# Patient Record
Sex: FEMALE | Race: WHITE | Employment: OTHER | URBAN - NONMETROPOLITAN AREA
[De-identification: names, ages, dates, MRNs, and addresses within clinical notes are randomized per-mention and may not be internally consistent; named-entity substitution may affect disease eponyms.]

---

## 2017-02-23 ENCOUNTER — HISTORY (OUTPATIENT)
Dept: FAMILY MEDICINE | Facility: OTHER | Age: 22
End: 2017-02-23

## 2017-02-23 ENCOUNTER — OFFICE VISIT - GICH (OUTPATIENT)
Dept: FAMILY MEDICINE | Facility: OTHER | Age: 22
End: 2017-02-23

## 2017-02-23 DIAGNOSIS — H60.90 OTITIS EXTERNA: ICD-10-CM

## 2017-06-16 ENCOUNTER — OFFICE VISIT - GICH (OUTPATIENT)
Dept: FAMILY MEDICINE | Facility: OTHER | Age: 22
End: 2017-06-16

## 2017-06-16 ENCOUNTER — COMMUNICATION - GICH (OUTPATIENT)
Dept: FAMILY MEDICINE | Facility: OTHER | Age: 22
End: 2017-06-16

## 2017-06-16 ENCOUNTER — HISTORY (OUTPATIENT)
Dept: FAMILY MEDICINE | Facility: OTHER | Age: 22
End: 2017-06-16

## 2017-06-16 DIAGNOSIS — J02.9 ACUTE PHARYNGITIS: ICD-10-CM

## 2017-06-16 DIAGNOSIS — H92.09 OTALGIA: ICD-10-CM

## 2017-06-16 LAB — STREP A ANTIGEN - HISTORICAL: NEGATIVE

## 2017-12-27 NOTE — PROGRESS NOTES
"Patient Information     Patient Name MRN Sex Fernandez Monroe [id number] Female 1995      Progress Notes by Mirna Awad NP at 2017 11:00 AM     Author:  Mirna Awad NP Service:  (none) Author Type:  PHYS- Nurse Practitioner     Filed:  2017  4:48 PM Encounter Date:  2017 Status:  Signed     :  Mirna Awad NP (PHYS- Nurse Practitioner)            HPI:    Fernandez Iverson is a 21 y.o. female who presents to clinic today with mother for ear/throat pain.   Bilateral ear pain and sore throat for the past 2 days.  No fevers.  Ears with pressure.   No ringing, buzzing, or pulsating.  Painful to swallow.  Mild headaches.  No runny or stuffy nose.  No cough.  Appetite normal.  Energy normal.  No OTC treatments.             Past Medical History:     Diagnosis  Date     Overweight      Physical exam, annual      No past surgical history on file.  Social History       Substance Use Topics         Smoking status:   Passive Smoke Exposure - Never Smoker     Smokeless tobacco:   Never Used      Comment: occasional      Alcohol use   No     No current outpatient prescriptions on file.     No current facility-administered medications for this visit.      Medications have been reviewed by me and are current to the best of my knowledge and ability.    Allergies     Allergen  Reactions     Sulfa (Sulfonamide Antibiotics) Hives       Past medical history, past surgical history, current medications and allergies reviewed and accurate to the best of my knowledge.        ROS:  Refer to HPI    /70  Pulse 97  Temp 98.3  F (36.8  C) (Temporal)   Ht 1.57 m (5' 1.81\")  Wt 79.8 kg (176 lb)  Breastfeeding? No  BMI 32.39 kg/m2    EXAM:  General Appearance: Well appearing female adolescent, appropriate appearance for age. No acute distress  Head: normocephalic, atraumatic  Ears: Left TM with bony landmarks appreciated, no erythema, no effusion, no bulging, no purulence.  Right TM " with bony landmarks appreciated, no erythema, no effusion, no bulging, no purulence.   Left auditory canal clear.  Right auditory canal clear.  Normal external ears, non tender.  Eyes: conjunctivae normal, no drainage  Orophayrnx: moist mucous membranes, posterior pharynx with erythema and irritation, tonsils without hypertrophy, no erythema, no exudates or petechiae, no post nasal drip seen.    Neck: supple without adenopathy  Respiratory: normal chest wall and respirations.  Normal effort.  Clear to auscultation bilaterally, no wheezing, crackles or rhonchi.  No increased work of breathing.  No cough appreciated  Cardiac: RRR with no murmurs  Musculoskeletal:  Normal gait.  Equal movement of bilateral upper extremities.  Equal movement of bilateral lower extremities.    Psychological: normal affect, alert and pleasant      Labs:  Results for orders placed or performed in visit on 06/16/17      RAPID STREP WITH REFLEX CULTURE      Result  Value Ref Range    STREP A ANTIGEN           Negative Negative           ASSESSMENT/PLAN:    ICD-10-CM    1. Sore throat J02.9 RAPID STREP WITH REFLEX CULTURE      RAPID STREP WITH REFLEX CULTURE   2. Ear ache H92.09          No indications of ear infection on exam  Negative rapid strep test  Likely viral illness.  No antibiotics indicated.  Encouraged fluids  Symptomatic treatment - tea, salt water gargles, honey, elevation, lozenges, etc   Tylenol or ibuprofen PRN  Follow up if symptoms persist or worsen or concerns        Patient Instructions   Will call with strep results      Salt water gargles      Honey    Tea    Lozenges/cough drops      Fluids      Tylenol or ibuprofen

## 2017-12-28 NOTE — TELEPHONE ENCOUNTER
Patient Information     Patient Name MRFernandez Ocasio [id number] Female 1995      Telephone Encounter by Nkechi Finley at 2017 12:03 PM     Author:  Nkechi Finley Service:  (none) Author Type:  (none)     Filed:  2017 12:03 PM Encounter Date:  2017 Status:  Signed     :  Nkechi Finley            ----- Message from Steven Merida NP sent at 2017 11:56 AM CDT -----  Call patient  Negative rapid strep test  STEVEN MERIDA NP ....................  2017   11:56 AM

## 2017-12-28 NOTE — TELEPHONE ENCOUNTER
Patient Information     Patient Name MRN Fernandez Zelaya [id number] Female 1995      Telephone Encounter by Nkechi Finley at 2017 12:03 PM     Author:  Nkechi Finley Service:  (none) Author Type:  (none)     Filed:  2017 12:04 PM Encounter Date:  2017 Status:  Signed     :  Nkechi Finley            Patient notified of results.   Nkechi Finley...2017..12:04 PM

## 2017-12-29 NOTE — PATIENT INSTRUCTIONS
Patient Information     Patient Name MRN Fernandez Zelaya [id number] Female 1995      Patient Instructions by Mirna Awad NP at 2017 11:00 AM     Author:  Mirna Awad NP Service:  (none) Author Type:  PHYS- Nurse Practitioner     Filed:  2017 11:29 AM Encounter Date:  2017 Status:  Signed     :  Mirna Awad NP (PHYS- Nurse Practitioner)            Will call with strep results      Salt water gargles      Honey    Tea    Lozenges/cough drops      Fluids      Tylenol or ibuprofen

## 2017-12-30 NOTE — NURSING NOTE
Patient Information     Patient Name MRN Fernandez Zelaya [id number] Female 1995      Nursing Note by Alina Ledbetter at 2017 11:00 AM     Author:  Alina Ledbetter Service:  (none) Author Type:  NURS- Student Practical Nurse     Filed:  2017 11:02 AM Encounter Date:  2017 Status:  Signed     :  Alina Ledbetter (NURS- Student Practical Nurse)            Patient presents with bilateral ear pain that goes down the side of the neck, headache for 2 days. Alina Ledbetter LPN .............2017  10:58 AM

## 2018-01-03 NOTE — PROGRESS NOTES
"Patient Information     Patient Name MRN Fernandez Zelaya [id number] Female 1995      Progress Notes by Lin Thacker MD at 2017  2:45 PM     Author:  Lin Thacker MD Service:  (none) Author Type:  Physician     Filed:  2017  5:06 PM Encounter Date:  2017 Status:  Signed     :  Lin Thacker MD (Physician)            SUBJECTIVE:  Fernandez Iverson is a 21 y.o. female who complains of ear pain.  Duration: 2 weeks  Severity: mild  Modifiers: nothing tried  Trend of symptoms: steady for several days  Fever: none  Ear pain is: left sided pressure like  Other symptoms include: drainage started a few days ago  Piercing on that side is red and tender  History of same illness: no prior history of similar illness  Ill contacts: no contacts known with similar symptoms  Current Outpatient Prescriptions       Medication  Sig Dispense Refill     ciprofloxacin-hydrocortisone (CIPRO HC) 0.2%-1% otic suspension Place 3 Drops into left ear 2 times daily for 7 days. 1 Bottle 0     No current facility-administered medications for this visit.      Medications have been reviewed by me and are current to the best of my knowledge and ability.    Allergies as of 2017 - Jv as Reviewed 2017      Allergen  Reaction Noted     Sulfa (sulfonamide antibiotics) Hives 2014       OBJECTIVE:  Visit Vitals       /74     Pulse 94     Ht 1.575 m (5' 2\")     Wt 79 kg (174 lb 3.2 oz)     LMP 2017     BMI 31.86 kg/m2     General appearance: healthy, alert and cooperative.   Left Ear: normal; external ear canal red and slightly swollen  Right Ear: normal; external ear canal and TM clear, nontender.  Nose: clear rhinorrhea  Oropharynx: normal pharynx and buccal mucosa. Dental hygeine adequate.  Neck: normal; supple with no masses or nodes.  Lungs:normal chest wall and respirations; clear to auscultation.  Heart: normal; regular rate and rhythm.  S1, S2, no murmur, " click, gallop, or rubs.    ASSESSMENT/PLAN:    ICD-10-CM    1. Otitis externa, unspecified chronicity, unspecified laterality, unspecified type H60.90 ciprofloxacin-hydrocortisone (CIPRO HC) 0.2%-1% otic suspension         Medications as per orders.   Symptomatic therapy suggested: use increased fluids and rest and acetaminophen prn.   Call or return to clinic prn if these symptoms worsen or fail to improve as anticipated.  Lin Aldana MD  5:05 PM 2/23/2017

## 2018-01-03 NOTE — NURSING NOTE
Patient Information     Patient Name MRN Fernandez Zelaya [id number] Female 1995      Nursing Note by Ludy Moses at 2017  2:45 PM     Author:  Ludy Moses Service:  (none) Author Type:  (none)     Filed:  2017  2:57 PM Encounter Date:  2017 Status:  Signed     :  Ludy Moses            Patient is here for left ear draining, started a couple weeks ago, having pain off and on.   Ludy Moses LPN .............2017  2:42 PM

## 2018-01-19 ENCOUNTER — HISTORY (OUTPATIENT)
Dept: FAMILY MEDICINE | Facility: OTHER | Age: 23
End: 2018-01-19

## 2018-01-19 ENCOUNTER — OFFICE VISIT - GICH (OUTPATIENT)
Dept: FAMILY MEDICINE | Facility: OTHER | Age: 23
End: 2018-01-19

## 2018-01-19 DIAGNOSIS — Z12.4 ENCOUNTER FOR SCREENING FOR MALIGNANT NEOPLASM OF CERVIX: ICD-10-CM

## 2018-01-19 DIAGNOSIS — Z00.00 ENCOUNTER FOR GENERAL ADULT MEDICAL EXAMINATION WITHOUT ABNORMAL FINDINGS: ICD-10-CM

## 2018-01-27 VITALS
HEART RATE: 94 BPM | WEIGHT: 174.2 LBS | DIASTOLIC BLOOD PRESSURE: 74 MMHG | SYSTOLIC BLOOD PRESSURE: 136 MMHG | HEIGHT: 62 IN | BODY MASS INDEX: 32.06 KG/M2

## 2018-01-27 VITALS
WEIGHT: 176 LBS | HEIGHT: 62 IN | HEART RATE: 97 BPM | DIASTOLIC BLOOD PRESSURE: 70 MMHG | BODY MASS INDEX: 32.39 KG/M2 | SYSTOLIC BLOOD PRESSURE: 122 MMHG | TEMPERATURE: 98.3 F

## 2018-02-05 ENCOUNTER — AMBULATORY - GICH (OUTPATIENT)
Dept: FAMILY MEDICINE | Facility: OTHER | Age: 23
End: 2018-02-05

## 2018-02-09 VITALS
WEIGHT: 179.6 LBS | HEART RATE: 102 BPM | BODY MASS INDEX: 33.05 KG/M2 | HEIGHT: 62 IN | SYSTOLIC BLOOD PRESSURE: 129 MMHG | DIASTOLIC BLOOD PRESSURE: 74 MMHG

## 2018-02-13 NOTE — NURSING NOTE
Patient Information     Patient Name MRN Fernandez Zelaya [id number] Female 1995      Nursing Note by Ludy Moses at 2018 12:45 PM     Author:  Ludy Moses Service:  (none) Author Type:  (none)     Filed:  2018  1:29 PM Encounter Date:  2018 Status:  Signed     :  Ludy Moses            Patient is here for physical, no concerns at this time.  Ludy Moses LPN .............2018  12:11 PM

## 2018-02-13 NOTE — PROGRESS NOTES
Patient Information     Patient Name MRN Sex Fernandez James [id number] Female 1995      Progress Notes by Lin Thacker MD at 2018 12:45 PM     Author:  Lin Thacker MD Service:  (none) Author Type:  Physician     Filed:  2018  1:29 PM Encounter Date:  2018 Status:  Signed     :  Lin Thacker MD (Physician)            ANNUAL EXAM ADULT FEMALE - GYN    Fernandez Iverson is a 22 y.o. female, G 0, P 0, here today for her annual gynecologic exam.  HPI:  Presents for annual exam. She is not sexually active, this will be her first pap smear. Regular menses  No other concerns    CURRENT MEDICATIONS:  No current outpatient prescriptions on file.     No current facility-administered medications for this visit.      Medications have been reviewed by me and are current to the best of my knowledge and ability.      ALLERGIES:  Sulfa (sulfonamide antibiotics)     Past Medical History:     Diagnosis  Date     Overweight(278.02)      Physical exam, annual        No past surgical history on file.    SOCIAL HISTORY:  Social History     Social History        Marital status:  Single     Spouse name: N/A     Number of children:  N/A     Years of education:  N/A     Occupational History      Not on file.     Social History Main Topics         Smoking status:   Passive Smoke Exposure - Never Smoker     Smokeless tobacco:   Never Used      Comment: occasional      Alcohol use   Yes     Drug use:   No     Sexual activity:   No     Other Topics  Concern     Not on file      Social History Narrative     Attending 7th grade  of .  Intact family.  Mother works for [employer]  Sibling; born .       No family history on file.    RISK FACTORS  Social History       Substance Use Topics         Smoking status:   Passive Smoke Exposure - Never Smoker     Smokeless tobacco:   Never Used      Comment: occasional      Alcohol use   Yes      Exercise Times/wk: 3  Seat  "Belt Use: 100%  Birth Control Method: none  High Risk/Behavior: none    PREVENTIVE SERVICES  Preventive services were reviewed today, January 19, 2018.    LMP: Patient's last menstrual period was 01/07/2018.  Menstrual Regularity: regular, every 28-30 days  Flow: light    ROS  Negative for Review of Systems not covered in the HPI.    PHYSICAL EXAM  /74 (Cuff Site: Right Arm, Position: Sitting, Cuff Size: Adult Regular)  Pulse (!) 102  Ht 1.562 m (5' 1.5\")  Wt 81.5 kg (179 lb 9.6 oz)  LMP 01/07/2018  BMI 33.39 kg/m2  General Appearance:  Alert, appropriate appearance for age. No acute distress  HEENT Exam:  Grossly normal.  Chest/Respiratory Exam: Normal chest wall and respirations. Clear to auscultation.  Cardiovascular Exam: Regular rate and rhythm. S1, S2, no murmur, click, gallop, or rubs.  Gastrointestinal Exam: Soft, non-tender, no masses or organomegaly.  Pelvic Exam Female: Vulva and vagina appear normal. Bimanual exam reveals normal uterus and adnexa. Clinical staff offered to be present for exam: .   Lymphatic Exam: Non-palpable nodes in neck, clavicular, axillary, or inguinal regions.  Musculoskeletal Exam: Back is straight and non-tender, full ROM of upper and lower extremities.  Psychiatric Exam: Alert and oriented, appropriate affect.  Clinical staff offered to be present for exam: yes, pt declined.  PHQ Depression Screening 1/19/2018   Date of PHQ exam (doc flow) 1/19/2018   1. Lack of interest/pleasure 0 - Not at all   2. Feeling down/depressed 0 - Not at all   PHQ-2 TOTAL SCORE 0   3. Trouble sleeping -   4. Decreased energy -   5. Appetite change -   6. Feelings of failure -   7. Trouble concentrating -   8. Activity level -   9. Hurting yourself -   PHQ-9 TOTAL SCORE -   PHQ-9 Severity Level -   Functional Impairment -   Some recent data might be hidden        ASSESSMENT/PLAN    ICD-10-CM    1. Annual physical exam Z00.00    2. Cervical cancer screening Z12.4 GYN THIN PREP PAP SCREEN " IMAGED       BP Readings from Last 1 Encounters:01/19/18 : 129/74  Ms. Iverson's blood pressure is out of the normal range for adults. Per JNC-8 guidelines normal adult blood pressure is < 120/80, pre-hypertensive is between 120/80 and 139/89, and hypertension is 140/90 or greater. Risks of hypertension were discussed. Patient's strategy will be weight loss and increased activity  Patient is counseled on importance of regular self breast exams, annual mammogram starting at the age of 40. Patient may go to every -3 years for screening Pap smears, should continue annual pelvic exams. Discussed the importance of calcium and vitamin D supplementation and screening for osteoporosis. Immunizations are current. Pap smear was performed today and patient will receive a letter with results. Reviewed the importance of sunscreen, seatbelt and helmet use.  Lin Aldana MD  1:29 PM 1/19/2018

## 2018-03-08 ENCOUNTER — DOCUMENTATION ONLY (OUTPATIENT)
Dept: FAMILY MEDICINE | Facility: OTHER | Age: 23
End: 2018-03-08

## 2018-03-25 ENCOUNTER — HEALTH MAINTENANCE LETTER (OUTPATIENT)
Age: 23
End: 2018-03-25

## 2021-02-19 ENCOUNTER — OFFICE VISIT (OUTPATIENT)
Dept: FAMILY MEDICINE | Facility: OTHER | Age: 26
End: 2021-02-19
Attending: FAMILY MEDICINE
Payer: COMMERCIAL

## 2021-02-19 VITALS
SYSTOLIC BLOOD PRESSURE: 122 MMHG | HEIGHT: 62 IN | HEART RATE: 78 BPM | TEMPERATURE: 98.9 F | WEIGHT: 176.4 LBS | DIASTOLIC BLOOD PRESSURE: 70 MMHG | RESPIRATION RATE: 20 BRPM | OXYGEN SATURATION: 100 % | BODY MASS INDEX: 32.46 KG/M2

## 2021-02-19 DIAGNOSIS — R53.83 OTHER FATIGUE: ICD-10-CM

## 2021-02-19 DIAGNOSIS — N92.4 EXCESSIVE BLEEDING IN PREMENOPAUSAL PERIOD: Primary | ICD-10-CM

## 2021-02-19 LAB
BASOPHILS # BLD AUTO: 0 10E9/L (ref 0–0.2)
BASOPHILS NFR BLD AUTO: 0.3 %
DEPRECATED CALCIDIOL+CALCIFEROL SERPL-MC: 14 NG/ML
DIFFERENTIAL METHOD BLD: NORMAL
EOSINOPHIL # BLD AUTO: 0 10E9/L (ref 0–0.7)
EOSINOPHIL NFR BLD AUTO: 0.3 %
ERYTHROCYTE [DISTWIDTH] IN BLOOD BY AUTOMATED COUNT: 12.7 % (ref 10–15)
FERRITIN SERPL-MCNC: 31 NG/ML (ref 23.9–336.2)
HCT VFR BLD AUTO: 37.9 % (ref 35–47)
HGB BLD-MCNC: 13.1 G/DL (ref 11.7–15.7)
IMM GRANULOCYTES # BLD: 0 10E9/L (ref 0–0.4)
IMM GRANULOCYTES NFR BLD: 0.3 %
LYMPHOCYTES # BLD AUTO: 1.7 10E9/L (ref 0.8–5.3)
LYMPHOCYTES NFR BLD AUTO: 26.2 %
MCH RBC QN AUTO: 29.4 PG (ref 26.5–33)
MCHC RBC AUTO-ENTMCNC: 34.6 G/DL (ref 31.5–36.5)
MCV RBC AUTO: 85 FL (ref 78–100)
MONOCYTES # BLD AUTO: 0.4 10E9/L (ref 0–1.3)
MONOCYTES NFR BLD AUTO: 5.7 %
NEUTROPHILS # BLD AUTO: 4.5 10E9/L (ref 1.6–8.3)
NEUTROPHILS NFR BLD AUTO: 67.2 %
PLATELET # BLD AUTO: 240 10E9/L (ref 150–450)
RBC # BLD AUTO: 4.45 10E12/L (ref 3.8–5.2)
TSH SERPL DL<=0.05 MIU/L-ACNC: 2.08 IU/ML (ref 0.34–5.6)
WBC # BLD AUTO: 6.6 10E9/L (ref 4–11)

## 2021-02-19 PROCEDURE — 82728 ASSAY OF FERRITIN: CPT | Mod: ZL | Performed by: FAMILY MEDICINE

## 2021-02-19 PROCEDURE — 36415 COLL VENOUS BLD VENIPUNCTURE: CPT | Mod: ZL | Performed by: FAMILY MEDICINE

## 2021-02-19 PROCEDURE — 84443 ASSAY THYROID STIM HORMONE: CPT | Mod: ZL | Performed by: FAMILY MEDICINE

## 2021-02-19 PROCEDURE — 82306 VITAMIN D 25 HYDROXY: CPT | Mod: ZL | Performed by: FAMILY MEDICINE

## 2021-02-19 PROCEDURE — 85025 COMPLETE CBC W/AUTO DIFF WBC: CPT | Mod: ZL | Performed by: FAMILY MEDICINE

## 2021-02-19 PROCEDURE — 99213 OFFICE O/P EST LOW 20 MIN: CPT | Performed by: FAMILY MEDICINE

## 2021-02-19 RX ORDER — LEVONORGESTREL AND ETHINYL ESTRADIOL 0.15-0.03
1 KIT ORAL DAILY
Qty: 84 TABLET | Refills: 3 | Status: SHIPPED | OUTPATIENT
Start: 2021-02-19 | End: 2022-01-24

## 2021-02-19 ASSESSMENT — MIFFLIN-ST. JEOR: SCORE: 1495.46

## 2021-02-19 ASSESSMENT — PAIN SCALES - GENERAL: PAINLEVEL: NO PAIN (0)

## 2021-02-19 NOTE — PROGRESS NOTES
"    Concerns today:   Nursing Notes:   Ludy Moses, LPN  2021  3:56 PM  Signed  Patient is here with her mom to discuss birth control. She is not currently on anything, is not sure what she would like to take.     Patient's last menstrual period was 2021 (exact date).  Medication Reconciliation: complete    Ludy Moses, LPN  2021 3:42 PM      24 yo female presents with a few concerns.  Her periods have become more painful and heavy.  She has heavy flow for 1 to 2 days.  Significant pain, nausea headaches and fatigue the first day.  She recently became sexually active.  Pap smear 2018 and HPV were normal.    Also complains of fatigue and is wondering if her iron is low.      ROS:  CONSTITUTIONAL: no fatigue, no unexpected change in weight  SKIN: no worrisome rashes, no worrisome moles, no worrisome lesions  EYES: no acute vision problems or changes  ENT: no ear problems, no mouth problems, no throat problems  RESP: no significant cough, no shortness of breath  CV: no chest pain, no palpitations, no new or worsening peripheral edema  GI: no nausea, no vomiting, no constipation, no diarrhea    Sexually Active: Yes  Sexual concerns: No   Contraception:none   P: 0  Menarche:  Patient's last menstrual period was 2021 (exact date). Menses: q 30 days  STD History: Neg  Last Pap Smear Date:  normal  Abnormal Pap History: None              EXAMINATION:   /70 (BP Location: Right arm, Patient Position: Sitting, Cuff Size: Adult Regular)   Pulse 78   Temp 98.9  F (37.2  C) (Tympanic)   Resp 20   Ht 1.562 m (5' 1.5\")   Wt 80 kg (176 lb 6.4 oz)   LMP 2021 (Exact Date)   SpO2 100%   Breastfeeding No   BMI 32.79 kg/m    GENERAL: healthy, alert and no distress  EYES: Eyes grossly normal to inspection, extraocular movements - intact, and PERRL  HENT: ear canals- normal; TMs- normal; Nose- normal; Mouth- no ulcers, no lesions  NECK: no tenderness, no adenopathy, no asymmetry, no " masses, no stiffness; thyroid- normal to palpation  RESP: lungs clear to auscultation - no rales, no rhonchi, no wheezes  CV: regular rates and rhythm, normal S1 S2, no S3 or S4 and no murmur, no click or rub -  LYMPHATICS: ant. cervical- normal, post. cervical- normal, axillary- normal, supraclavicular- normal, inguinal- normal    ASSESSMENT:  1. Excessive bleeding in premenopausal period    2. Other fatigue          PLAN:  Discussed options for management of menorrhagia.  Patient has decided to try combined OCPs.  She will start tonight but she just got her period today.  We will plan to see her in 2 to 3 months and do a Pap smear and pelvic exam at that time.    Proceed with ferritin vitamin D CBC and thyroid check.      Lin Thacker MD

## 2021-02-19 NOTE — NURSING NOTE
Patient is here with her mom to discuss birth control. She is not currently on anything, is not sure what she would like to take.     Patient's last menstrual period was 02/19/2021 (exact date).  Medication Reconciliation: meera Moses LPN  2/19/2021 3:42 PM

## 2021-03-04 DIAGNOSIS — E55.9 VITAMIN D DEFICIENCY: Primary | ICD-10-CM

## 2021-03-06 ENCOUNTER — HEALTH MAINTENANCE LETTER (OUTPATIENT)
Age: 26
End: 2021-03-06

## 2021-04-25 ENCOUNTER — HEALTH MAINTENANCE LETTER (OUTPATIENT)
Age: 26
End: 2021-04-25

## 2021-04-27 ENCOUNTER — OFFICE VISIT (OUTPATIENT)
Dept: FAMILY MEDICINE | Facility: OTHER | Age: 26
End: 2021-04-27
Attending: FAMILY MEDICINE
Payer: COMMERCIAL

## 2021-04-27 VITALS
WEIGHT: 173.2 LBS | HEART RATE: 91 BPM | RESPIRATION RATE: 20 BRPM | OXYGEN SATURATION: 99 % | SYSTOLIC BLOOD PRESSURE: 114 MMHG | HEIGHT: 62 IN | TEMPERATURE: 97.8 F | BODY MASS INDEX: 31.87 KG/M2 | DIASTOLIC BLOOD PRESSURE: 70 MMHG

## 2021-04-27 DIAGNOSIS — Z00.00 ANNUAL PHYSICAL EXAM: Primary | ICD-10-CM

## 2021-04-27 DIAGNOSIS — Z11.3 SCREEN FOR STD (SEXUALLY TRANSMITTED DISEASE): ICD-10-CM

## 2021-04-27 DIAGNOSIS — Z12.4 CERVICAL CANCER SCREENING: ICD-10-CM

## 2021-04-27 DIAGNOSIS — E55.9 VITAMIN D DEFICIENCY: ICD-10-CM

## 2021-04-27 LAB
C TRACH DNA SPEC QL NAA+PROBE: NOT DETECTED
DEPRECATED CALCIDIOL+CALCIFEROL SERPL-MC: 30.4 NG/ML
N GONORRHOEA DNA SPEC QL NAA+PROBE: NOT DETECTED
SPECIMEN SOURCE: NORMAL

## 2021-04-27 PROCEDURE — 82306 VITAMIN D 25 HYDROXY: CPT | Mod: ZL | Performed by: FAMILY MEDICINE

## 2021-04-27 PROCEDURE — 90471 IMMUNIZATION ADMIN: CPT | Performed by: FAMILY MEDICINE

## 2021-04-27 PROCEDURE — 87491 CHLMYD TRACH DNA AMP PROBE: CPT | Mod: ZL | Performed by: FAMILY MEDICINE

## 2021-04-27 PROCEDURE — 36415 COLL VENOUS BLD VENIPUNCTURE: CPT | Mod: ZL | Performed by: FAMILY MEDICINE

## 2021-04-27 PROCEDURE — G0123 SCREEN CERV/VAG THIN LAYER: HCPCS | Performed by: FAMILY MEDICINE

## 2021-04-27 PROCEDURE — 99395 PREV VISIT EST AGE 18-39: CPT | Mod: 25 | Performed by: FAMILY MEDICINE

## 2021-04-27 PROCEDURE — 87591 N.GONORRHOEAE DNA AMP PROB: CPT | Mod: ZL | Performed by: FAMILY MEDICINE

## 2021-04-27 PROCEDURE — 90715 TDAP VACCINE 7 YRS/> IM: CPT | Performed by: FAMILY MEDICINE

## 2021-04-27 ASSESSMENT — MIFFLIN-ST. JEOR: SCORE: 1484.91

## 2021-04-27 ASSESSMENT — PAIN SCALES - GENERAL: PAINLEVEL: NO PAIN (0)

## 2021-04-27 NOTE — NURSING NOTE
Immunization Documentation    Prior to Immunization administration, verified patients identity using patient's name and date of birth. Please see IMMUNIZATIONS  and order for additional information.  Patient / Parent instructed to remain in clinic for 15 minutes and report any adverse reaction to staff immediately.    Was entire vial of medication used? Yes  Vial/Syringe: Ana Moses LPN  4/27/2021

## 2021-04-27 NOTE — NURSING NOTE
Patient is here for annual physical.     Patient's last menstrual period was 04/14/2021 (exact date).  Medication Reconciliation: complete    Ludy Moses LPN  4/27/2021 9:54 AM

## 2021-04-27 NOTE — PROGRESS NOTES
Female Physical Note    Concerns today:   Nursing Notes:   Ludy Moses LPN  2021 10:37 AM  Signed  Patient is here for annual physical.     Patient's last menstrual period was 2021 (exact date).  Medication Reconciliation: complete    Ludy Moses LPN  2021 9:54 AM      Ludy Moses LPN  2021 10:51 AM  Signed  Immunization Documentation    Prior to Immunization administration, verified patients identity using patient's name and date of birth. Please see IMMUNIZATIONS  and order for additional information.  Patient / Parent instructed to remain in clinic for 15 minutes and report any adverse reaction to staff immediately.    Was entire vial of medication used? Yes  Vial/Syringe: Syringe    Ludy Moses LPN  2021     25-year-old  presents for annual preventive physical.  Same sexual partner for the last year.  Is due for Pap smear and HPV.  Tolerating current birth control pill.  She gets a regular menses every month.  No other concerns or questions.    ROS:  CONSTITUTIONAL: no fatigue, no unexpected change in weight  SKIN: no worrisome rashes, no worrisome moles, no worrisome lesions  EYES: no acute vision problems or changes  ENT: no ear problems, no mouth problems, no throat problems  RESP: no significant cough, no shortness of breath  CV: no chest pain, no palpitations, no new or worsening peripheral edema  GI: no nausea, no vomiting, no constipation, no diarrhea    Sexually Active: Yes  Sexual concerns: No   Contraception:OCP-see med list   P: 0  Menarche:  Patient's last menstrual period was 2021 (exact date).   STD History: Neg  Last Pap Smear Date:  normal  Abnormal Pap History: None    Patient Active Problem List   Diagnosis     Plantar wart     Psoriasis       Current Outpatient Medications   Medication Sig Dispense Refill     levonorgestrel-ethinyl estradiol (NORDETTE) 0.15-30 MG-MCG tablet Take 1 tablet by mouth daily 84 tablet 3       Past  "Medical History:   Diagnosis Date     Encounter for general adult medical examination without abnormal findings     2009     Overweight     No Comments Provided            Problem List Medication List and Allergy List were reviewed.    Patient is an established patient of this clinic..    Social History     Tobacco Use     Smoking status: Passive Smoke Exposure - Never Smoker     Smokeless tobacco: Never Used     Tobacco comment: Quit smoking: occasional   Substance Use Topics     Alcohol use: Yes     Comment: social      Single  Children ?     RISK BEHAVIORS AND HEALTHY HABITS:  Tobacco Use/Smoking: None  Illicit Drug Use: None  Do you use alcohol? Yes  Diet (5-7 servings of fruits/veg daily): Yes   Exercise (30 min accumulated most days):Yes  Dental Care: Yes   Calcium 1500 mg/d:  Yes  Seat Belt Use: Yes     Pap every 3 years for women 21-29. Recommended and patient accepted testing.  Breast CA Screening (>41 yo or 10 y before 1st degree relative diagnosis): Testing not indicated   CV Risk based on Pooled Cohort Risk:   Pooled Cohort Risk Calculator    Immunization History   Administered Date(s) Administered     DTAP (<7y) 1995, 1995, 01/09/1997, 10/23/1997, 11/30/2000     HPV Quadrivalent 08/06/2009, 10/15/2009, 03/18/2010     HepB, Unspecified 1995, 1995, 04/10/1997     Hib, Unspecified 1995, 1995, 01/09/1997, 07/31/1997     MMR 10/23/1997, 11/30/2000     Meningococcal (Menactra ) 08/06/2009, 01/15/2016     Polio, Unspecified  1996, 1995, 01/09/1997     Poliovirus, inactivated (IPV) 11/30/2000     TDAP Vaccine (Adacel) 08/06/2009     Varicella 07/27/1998, 08/06/2009    Reviewed Immunization Record Today    EXAMINATION:   /70 (BP Location: Right arm, Patient Position: Sitting, Cuff Size: Adult Regular)   Pulse 91   Temp 97.8  F (36.6  C) (Tympanic)   Resp 20   Ht 1.568 m (5' 1.75\")   Wt 78.6 kg (173 lb 3.2 oz)   LMP 04/14/2021 (Exact Date)   SpO2 99%   " Breastfeeding No   BMI 31.94 kg/m    GENERAL: healthy, alert and no distress  EYES: Eyes grossly normal to inspection, extraocular movements - intact, and PERRL  HENT: ear canals- normal; TMs- normal; Nose- normal; Mouth- no ulcers, no lesions  NECK: no tenderness, no adenopathy, no asymmetry, no masses, no stiffness; thyroid- normal to palpation  RESP: lungs clear to auscultation - no rales, no rhonchi, no wheezes  BREAST: no masses, no tenderness, no nipple discharge, no palpable axillary masses or adenopathy  CV: regular rates and rhythm, normal S1 S2, no S3 or S4 and no murmur, no click or rub -  ABDOMEN: soft, no tenderness, no  hepatosplenomegaly, no masses, normal bowel sounds  MS: extremities- no gross deformities noted, no edema  SKIN: no suspicious lesions, no rashes  NEURO: strength and tone- normal, sensory exam- grossly normal, mentation- intact, speech- normal, reflexes- symmetric  - female: cervix- normal, adnexae- normal; uterus- normal, no masses, no discharge  LYMPHATICS: ant. cervical- normal, post. cervical- normal, axillary- normal, supraclavicular- normal, inguinal- normal    ASSESSMENT:  1. Annual physical exam    2. Screen for STD (sexually transmitted disease)    3. Vitamin D deficiency    4. Cervical cancer screening          PLAN:  Patient is counseled on importance of regular self breast exams and mammograms every 1-2 years starting at age 40. Patient will proceed with pap smears every 3-5 years until age 65. Discussed importance of calcium and vitamin D supplementation and osteoporosis screening. Immunizations are updated based on CDC recommendations and patients desire. Reviewed importance of sunscreen, limit sun exposure and monitoring for changing moles with ABCDEs. Recommend seatbelt use and helmets with biking, skiing and ATV/Snowmobile use.    There are no Patient Instructions on file for this visit.    GCC pending.  Refill birth control pill for 1 year.  Reinforce importance of  regular exercise.    Lin Thacker MD

## 2021-05-03 LAB
COPATH REPORT: NORMAL
PAP: NORMAL

## 2021-09-28 ENCOUNTER — MYC MEDICAL ADVICE (OUTPATIENT)
Dept: FAMILY MEDICINE | Facility: OTHER | Age: 26
End: 2021-09-28

## 2021-10-09 ENCOUNTER — HEALTH MAINTENANCE LETTER (OUTPATIENT)
Age: 26
End: 2021-10-09

## 2021-11-04 ENCOUNTER — OFFICE VISIT (OUTPATIENT)
Dept: FAMILY MEDICINE | Facility: OTHER | Age: 26
End: 2021-11-04
Attending: FAMILY MEDICINE
Payer: COMMERCIAL

## 2021-11-04 VITALS
DIASTOLIC BLOOD PRESSURE: 68 MMHG | HEART RATE: 88 BPM | TEMPERATURE: 97.1 F | OXYGEN SATURATION: 100 % | SYSTOLIC BLOOD PRESSURE: 124 MMHG | WEIGHT: 182.5 LBS | RESPIRATION RATE: 20 BRPM | BODY MASS INDEX: 33.65 KG/M2

## 2021-11-04 DIAGNOSIS — H61.893 IRRITATION OF EXTERNAL EAR CANAL, BILATERAL: ICD-10-CM

## 2021-11-04 DIAGNOSIS — H69.93 DYSFUNCTION OF BOTH EUSTACHIAN TUBES: Primary | ICD-10-CM

## 2021-11-04 PROCEDURE — 99213 OFFICE O/P EST LOW 20 MIN: CPT | Performed by: FAMILY MEDICINE

## 2021-11-04 RX ORDER — HYDROCORTISONE AND ACETIC ACID 20.75; 10.375 MG/ML; MG/ML
3 SOLUTION AURICULAR (OTIC) 3 TIMES DAILY
Qty: 10 ML | Refills: 1 | Status: SHIPPED | OUTPATIENT
Start: 2021-11-04 | End: 2021-11-14

## 2021-11-04 RX ORDER — CETIRIZINE HYDROCHLORIDE 10 MG/1
10 TABLET ORAL DAILY
Qty: 14 TABLET | Refills: 0 | Status: SHIPPED | OUTPATIENT
Start: 2021-11-04

## 2021-11-04 RX ORDER — FLUTICASONE PROPIONATE 50 MCG
1 SPRAY, SUSPENSION (ML) NASAL DAILY
Qty: 16 G | Refills: 1 | Status: SHIPPED | OUTPATIENT
Start: 2021-11-04

## 2021-11-04 ASSESSMENT — PAIN SCALES - GENERAL: PAINLEVEL: MILD PAIN (2)

## 2021-11-04 ASSESSMENT — PATIENT HEALTH QUESTIONNAIRE - PHQ9: SUM OF ALL RESPONSES TO PHQ QUESTIONS 1-9: 9

## 2021-11-04 NOTE — NURSING NOTE
"Chief Complaint   Patient presents with     Ear Problem     Patient presents today with bilateral ear pain for the last 2 weeks.   Initial /68   Pulse 88   Temp 97.1  F (36.2  C) (Tympanic)   Resp 20   Wt 82.8 kg (182 lb 8 oz)   LMP 10/27/2021 (Exact Date)   SpO2 100%   BMI 33.65 kg/m   Estimated body mass index is 33.65 kg/m  as calculated from the following:    Height as of 4/27/21: 1.568 m (5' 1.75\").    Weight as of this encounter: 82.8 kg (182 lb 8 oz).  Medication Reconciliation: complete    Yun Cuellar LPN     FOOD SECURITY SCREENING QUESTIONS  Hunger Vital Signs:  Within the past 12 months we worried whether our food would run out before we got money to buy more. Never  Within the past 12 months the food we bought just didn't last and we didn't have money to get more. Never  Yun Cuellar LPN 11/4/2021 10:52 AM    "

## 2021-11-04 NOTE — PATIENT INSTRUCTIONS
Patient Education     Earache, No Infection (Adult)   Earaches can happen without an infection. They can occur when air and fluid build up behind the eardrum. They may cause a feeling of fullness and discomfort. They may also impair hearing. This is called otitis media with effusion (OME) or serous otitis media. It means there is fluid in the middle ear. It is not the same as acute otitis media, which is often from an infection.  OME can happen when you have a cold if congestion blocks the passage that drains the middle ear. This passage is called the eustachian tube. OME may also occur with nasal allergies or after a bacterial infection in the middle ear. Other causes are:    Trauma    Improper cleaning of wax from the ear    Bacterial infection of the mastoid bone (mastoiditis)    Tumor    Jaw pain    Changes in pressure, such as from flying or scuba diving    The pain or discomfort may come and go. You may hear clicking or popping sounds when you chew or swallow. You may feel that your balance is off. Or you may hear ringing in the ear.  It often takes from several weeks up to 3 months for the fluid to clear on its own. Oral pain relievers and ear drops help if there is pain. Decongestants and antihistamines sometimes help. Antibiotics don't help since there is no infection. Your healthcare provider may give you a nasal spray to help reduce swelling in the nose and eustachian tube. This can allow the ear to drain.  If your OME doesn't get better after 3 months, surgery may be used to drain the fluid. A small tube may also be put in the eardrum to help with drainage.  Because the middle ear fluid can become infected, watch for signs of an infection. These may develop later. They may include increased ear pain, fever, or drainage from the ear.  Home care  These home-care tips will help you take care of yourself:    You may use over-the-counter medicine as directed by your healthcare provider to control pain, unless  medicine was prescribed. If you have chronic liver or kidney disease or ever had a stomach ulcer or GI bleeding, talk with your healthcare provider before using any medicines.    Aspirin should never be used in anyone younger than age 18 who has a fever. It may cause severe liver damage.    Ask your healthcare provider if you may use over-the-counter decongestants such as phenylephrine or pseudoephedrine. Keep in mind they are not always helpful.    Talk with your healthcare provider about using nasal spray decongestants. Don't use them for more than 3 days, or as directed by your healthcare provider. Longer use can make congestion worse. Prescription nasal sprays from your healthcare provider don't often have such restrictions.    Antihistamines may help if you are also having allergy symptoms.    You may use medicines such as guaifenesin to thin mucus and help with drainage.  Follow-up care  Follow up with your healthcare provider or as advised if you are not feeling better after 3 days.  When to seek medical advice  Call your healthcare provider right away if any of these occur:    Ear pain that gets worse or that does not start to get better     Fever of 100.4 F (38 C) or higher, or as directed by your healthcare provider    Fluid or blood draining from the ear    Headache or sinus pain    Stiff neck    Unusual drowsiness or confusion  [employer] last reviewed this educational content on 12/1/2019 2000-2021 [employer]. All rights reserved. This information is not intended as a substitute for professional medical care. Always follow your healthcare professional's instructions.

## 2021-11-04 NOTE — PROGRESS NOTES
"  Assessment & Plan     1. Dysfunction of both eustachian tubes  2. Irritation of external ear canal, bilateral  Suspect allergic/atopic flare of ear symptoms.  Trial of Vosol-HC gtts x up to 10 days; along with 2 week course of cetirizine + flonase.  Rx to pharmacy.  Will follow up in ~2 weeks and if not improved, will discuss other options at that time.  Follow up appointment made for 11/17/2021.  - acetic acid-hydrocortisone (VOSOL-HC) 1-2 % otic solution; Place 3 drops into both ears 3 times daily for 10 days  Dispense: 10 mL; Refill: 1  - cetirizine (ZYRTEC) 10 MG tablet; Take 1 tablet (10 mg) by mouth daily  Dispense: 14 tablet; Refill: 0  - fluticasone (FLONASE) 50 MCG/ACT nasal spray; Spray 1 spray into both nostrils daily  Dispense: 16 g; Refill: 1    [facility]    Brittany Iverson is a 26 year old who presents for the following health issues:    MIRIAN Presley is here for B ear pain that first began 2 weeks ago.  Causing some minor headaches.  Pain is described like a plugged sensation; and can feel \"wet.\"  Nothing she is aware of makes it worse; nothing has made it better.  Other symptoms: NO uri symptoms, no fever, no hearing changes/loss.  NO vision changes, neck pain.  Has not had anything like this before, no previous ear surgeries.  Has a history of psoriasis.    Review of Systems   CONSTITUTIONAL: NEGATIVE for fever, chills, change in weight  ENT/MOUTH: NEGATIVE for mouth and throat problems  RESP: NEGATIVE for significant cough or SOB  CV: NEGATIVE for chest pain, palpitations or peripheral edema      Objective    /68   Pulse 88   Temp 97.1  F (36.2  C) (Tympanic)   Resp 20   Wt 82.8 kg (182 lb 8 oz)   LMP 10/27/2021 (Exact Date)   SpO2 100%   BMI 33.65 kg/m    Body mass index is 33.65 kg/m .  Physical Exam   GENERAL: healthy, alert and no distress  Head: Normocephalic, atraumatic.  Eyes: Conjunctiva clear, non icteric. PERRLA.  Ears: " External ear canals with some erythema/irritation particularly on inferior and posterior walls; TMs normal BL, with scant clear fluid behind L TM.  Nose: Septum midline, nasal mucosa pink and moist. No discharge.  Mouth / Throat: Normal dentition.  No oral lesions. Pharynx non erythematous, tonsils without hypertrophy.  Neck: Supple, no enlarged LN, trachea midline.  NECK: no adenopathy, no asymmetry, masses, or scars and thyroid normal to palpation  RESP: lungs clear to auscultation - no rales, rhonchi or wheezes  CV: regular rate and rhythm, normal S1 S2, no S3 or S4, no murmur, click or rub, no peripheral edema and peripheral pulses strong  MS: no gross musculoskeletal defects noted, no edema    No results found for any visits on 11/04/21.

## 2021-11-09 ENCOUNTER — ALLIED HEALTH/NURSE VISIT (OUTPATIENT)
Dept: FAMILY MEDICINE | Facility: OTHER | Age: 26
End: 2021-11-09
Attending: FAMILY MEDICINE
Payer: COMMERCIAL

## 2021-11-09 DIAGNOSIS — Z20.6 EXPOSURE TO HUMAN IMMUNODEFICIENCY VIRUS: Primary | ICD-10-CM

## 2021-11-09 DIAGNOSIS — Z20.822 COVID-19 RULED OUT: ICD-10-CM

## 2021-11-09 PROCEDURE — C9803 HOPD COVID-19 SPEC COLLECT: HCPCS

## 2021-11-09 PROCEDURE — U0005 INFEC AGEN DETEC AMPLI PROBE: HCPCS | Mod: ZL

## 2021-11-11 LAB — SARS-COV-2 RNA RESP QL NAA+PROBE: POSITIVE

## 2021-11-14 ENCOUNTER — HOSPITAL ENCOUNTER (EMERGENCY)
Facility: OTHER | Age: 26
Discharge: HOME | End: 2021-11-14
Attending: EMERGENCY MEDICINE | Admitting: EMERGENCY MEDICINE
Payer: COMMERCIAL

## 2021-11-14 ENCOUNTER — NURSE TRIAGE (OUTPATIENT)
Dept: NURSING | Facility: CLINIC | Age: 26
End: 2021-11-14
Payer: COMMERCIAL

## 2021-11-14 VITALS
RESPIRATION RATE: 20 BRPM | WEIGHT: 172 LBS | BODY MASS INDEX: 31.65 KG/M2 | HEIGHT: 62 IN | DIASTOLIC BLOOD PRESSURE: 80 MMHG | HEART RATE: 98 BPM | OXYGEN SATURATION: 99 % | TEMPERATURE: 102.5 F | SYSTOLIC BLOOD PRESSURE: 110 MMHG

## 2021-11-14 DIAGNOSIS — U07.1 INFECTION DUE TO 2019 NOVEL CORONAVIRUS: ICD-10-CM

## 2021-11-14 DIAGNOSIS — K52.9 GASTROENTERITIS: ICD-10-CM

## 2021-11-14 LAB
ALBUMIN SERPL-MCNC: 4.4 G/DL (ref 3.5–5.7)
ALP SERPL-CCNC: 34 U/L (ref 34–104)
ALT SERPL W P-5'-P-CCNC: 17 U/L (ref 7–52)
ANION GAP SERPL CALCULATED.3IONS-SCNC: 13 MMOL/L (ref 3–14)
AST SERPL W P-5'-P-CCNC: 24 U/L (ref 13–39)
BASOPHILS # BLD AUTO: 0 10E3/UL (ref 0–0.2)
BASOPHILS NFR BLD AUTO: 0 %
BILIRUB SERPL-MCNC: 0.8 MG/DL (ref 0.3–1)
BUN SERPL-MCNC: 12 MG/DL (ref 7–25)
CALCIUM SERPL-MCNC: 9.4 MG/DL (ref 8.6–10.3)
CHLORIDE BLD-SCNC: 99 MMOL/L (ref 98–107)
CO2 SERPL-SCNC: 23 MMOL/L (ref 21–31)
CREAT SERPL-MCNC: 1 MG/DL (ref 0.6–1.2)
EOSINOPHIL # BLD AUTO: 0 10E3/UL (ref 0–0.7)
EOSINOPHIL NFR BLD AUTO: 0 %
ERYTHROCYTE [DISTWIDTH] IN BLOOD BY AUTOMATED COUNT: 11.6 % (ref 10–15)
GFR SERPL CREATININE-BSD FRML MDRD: 78 ML/MIN/1.73M2
GLUCOSE BLD-MCNC: 119 MG/DL (ref 70–105)
HCT VFR BLD AUTO: 40.2 % (ref 35–47)
HGB BLD-MCNC: 14.3 G/DL (ref 11.7–15.7)
IMM GRANULOCYTES # BLD: 0 10E3/UL
IMM GRANULOCYTES NFR BLD: 0 %
LYMPHOCYTES # BLD AUTO: 0.7 10E3/UL (ref 0.8–5.3)
LYMPHOCYTES NFR BLD AUTO: 22 %
MCH RBC QN AUTO: 29.4 PG (ref 26.5–33)
MCHC RBC AUTO-ENTMCNC: 35.6 G/DL (ref 31.5–36.5)
MCV RBC AUTO: 83 FL (ref 78–100)
MONOCYTES # BLD AUTO: 0.3 10E3/UL (ref 0–1.3)
MONOCYTES NFR BLD AUTO: 8 %
NEUTROPHILS # BLD AUTO: 2.2 10E3/UL (ref 1.6–8.3)
NEUTROPHILS NFR BLD AUTO: 70 %
NRBC # BLD AUTO: 0 10E3/UL
NRBC BLD AUTO-RTO: 0 /100
PLATELET # BLD AUTO: 116 10E3/UL (ref 150–450)
POTASSIUM BLD-SCNC: 3.8 MMOL/L (ref 3.5–5.1)
PROT SERPL-MCNC: 7.6 G/DL (ref 6.4–8.9)
RBC # BLD AUTO: 4.87 10E6/UL (ref 3.8–5.2)
SODIUM SERPL-SCNC: 135 MMOL/L (ref 134–144)
WBC # BLD AUTO: 3.2 10E3/UL (ref 4–11)

## 2021-11-14 PROCEDURE — 258N000003 HC RX IP 258 OP 636: Performed by: EMERGENCY MEDICINE

## 2021-11-14 PROCEDURE — 99283 EMERGENCY DEPT VISIT LOW MDM: CPT | Performed by: EMERGENCY MEDICINE

## 2021-11-14 PROCEDURE — 250N000013 HC RX MED GY IP 250 OP 250 PS 637: Performed by: EMERGENCY MEDICINE

## 2021-11-14 PROCEDURE — 250N000011 HC RX IP 250 OP 636: Performed by: EMERGENCY MEDICINE

## 2021-11-14 PROCEDURE — 85025 COMPLETE CBC W/AUTO DIFF WBC: CPT | Performed by: EMERGENCY MEDICINE

## 2021-11-14 PROCEDURE — 36415 COLL VENOUS BLD VENIPUNCTURE: CPT | Performed by: EMERGENCY MEDICINE

## 2021-11-14 PROCEDURE — 96374 THER/PROPH/DIAG INJ IV PUSH: CPT | Performed by: EMERGENCY MEDICINE

## 2021-11-14 PROCEDURE — 96361 HYDRATE IV INFUSION ADD-ON: CPT | Performed by: EMERGENCY MEDICINE

## 2021-11-14 PROCEDURE — 99284 EMERGENCY DEPT VISIT MOD MDM: CPT | Mod: 25 | Performed by: EMERGENCY MEDICINE

## 2021-11-14 PROCEDURE — 80053 COMPREHEN METABOLIC PANEL: CPT | Performed by: EMERGENCY MEDICINE

## 2021-11-14 RX ORDER — ACETAMINOPHEN 500 MG
1000 TABLET ORAL ONCE
Status: COMPLETED | OUTPATIENT
Start: 2021-11-14 | End: 2021-11-14

## 2021-11-14 RX ORDER — ONDANSETRON 4 MG/1
4 TABLET, ORALLY DISINTEGRATING ORAL EVERY 8 HOURS PRN
Qty: 5 TABLET | Refills: 0 | Status: SHIPPED | OUTPATIENT
Start: 2021-11-14

## 2021-11-14 RX ORDER — SODIUM CHLORIDE 9 MG/ML
INJECTION, SOLUTION INTRAVENOUS CONTINUOUS
Status: DISCONTINUED | OUTPATIENT
Start: 2021-11-14 | End: 2021-11-15 | Stop reason: HOSPADM

## 2021-11-14 RX ORDER — ONDANSETRON 2 MG/ML
4 INJECTION INTRAMUSCULAR; INTRAVENOUS EVERY 30 MIN PRN
Status: DISCONTINUED | OUTPATIENT
Start: 2021-11-14 | End: 2021-11-15 | Stop reason: HOSPADM

## 2021-11-14 RX ADMIN — ONDANSETRON 4 MG: 2 INJECTION INTRAMUSCULAR; INTRAVENOUS at 21:29

## 2021-11-14 RX ADMIN — SODIUM CHLORIDE 1000 ML: 9 INJECTION, SOLUTION INTRAVENOUS at 21:30

## 2021-11-14 RX ADMIN — ACETAMINOPHEN 1000 MG: 500 TABLET, FILM COATED ORAL at 22:43

## 2021-11-14 ASSESSMENT — ENCOUNTER SYMPTOMS
AGITATION: 0
CHEST TIGHTNESS: 0
VOMITING: 1
FEVER: 0
LIGHT-HEADEDNESS: 0
ABDOMINAL PAIN: 1
ARTHRALGIAS: 0
NAUSEA: 1
DYSURIA: 0
CHILLS: 0
DIARRHEA: 0
SHORTNESS OF BREATH: 0

## 2021-11-14 ASSESSMENT — MIFFLIN-ST. JEOR: SCORE: 1470.5

## 2021-11-15 ENCOUNTER — HOSPITAL ENCOUNTER (EMERGENCY)
Facility: OTHER | Age: 26
Discharge: HOME | End: 2021-11-15
Attending: PHYSICIAN ASSISTANT | Admitting: PHYSICIAN ASSISTANT
Payer: COMMERCIAL

## 2021-11-15 VITALS
HEART RATE: 102 BPM | HEIGHT: 61 IN | TEMPERATURE: 101.1 F | BODY MASS INDEX: 32.1 KG/M2 | RESPIRATION RATE: 16 BRPM | OXYGEN SATURATION: 97 % | SYSTOLIC BLOOD PRESSURE: 115 MMHG | WEIGHT: 170 LBS | DIASTOLIC BLOOD PRESSURE: 70 MMHG

## 2021-11-15 DIAGNOSIS — R11.0 NAUSEA: ICD-10-CM

## 2021-11-15 DIAGNOSIS — U07.1 INFECTION DUE TO 2019 NOVEL CORONAVIRUS: ICD-10-CM

## 2021-11-15 LAB
ALBUMIN SERPL-MCNC: 3.9 G/DL (ref 3.5–5.7)
ALBUMIN UR-MCNC: 30 MG/DL
ALP SERPL-CCNC: 30 U/L (ref 34–104)
ALT SERPL W P-5'-P-CCNC: 17 U/L (ref 7–52)
ANION GAP SERPL CALCULATED.3IONS-SCNC: 10 MMOL/L (ref 3–14)
APPEARANCE UR: CLEAR
AST SERPL W P-5'-P-CCNC: 23 U/L (ref 13–39)
BACTERIA #/AREA URNS HPF: ABNORMAL /HPF
BASOPHILS # BLD AUTO: 0 10E3/UL (ref 0–0.2)
BASOPHILS NFR BLD AUTO: 0 %
BILIRUB SERPL-MCNC: 0.7 MG/DL (ref 0.3–1)
BILIRUB UR QL STRIP: ABNORMAL
BUN SERPL-MCNC: 9 MG/DL (ref 7–25)
CALCIUM SERPL-MCNC: 8.9 MG/DL (ref 8.6–10.3)
CHLORIDE BLD-SCNC: 102 MMOL/L (ref 98–107)
CO2 SERPL-SCNC: 25 MMOL/L (ref 21–31)
COLOR UR AUTO: YELLOW
CREAT SERPL-MCNC: 0.86 MG/DL (ref 0.6–1.2)
EOSINOPHIL # BLD AUTO: 0 10E3/UL (ref 0–0.7)
EOSINOPHIL NFR BLD AUTO: 0 %
ERYTHROCYTE [DISTWIDTH] IN BLOOD BY AUTOMATED COUNT: 11.8 % (ref 10–15)
GFR SERPL CREATININE-BSD FRML MDRD: >90 ML/MIN/1.73M2
GLUCOSE BLD-MCNC: 103 MG/DL (ref 70–105)
GLUCOSE UR STRIP-MCNC: NEGATIVE MG/DL
HCG UR QL: NEGATIVE
HCT VFR BLD AUTO: 37.8 % (ref 35–47)
HGB BLD-MCNC: 13.6 G/DL (ref 11.7–15.7)
HGB UR QL STRIP: NEGATIVE
IMM GRANULOCYTES # BLD: 0 10E3/UL
IMM GRANULOCYTES NFR BLD: 0 %
KETONES UR STRIP-MCNC: >=80 MG/DL
LACTATE SERPL-SCNC: 1 MMOL/L (ref 0.7–2)
LEUKOCYTE ESTERASE UR QL STRIP: NEGATIVE
LIPASE SERPL-CCNC: 89 U/L (ref 11–82)
LYMPHOCYTES # BLD AUTO: 0.6 10E3/UL (ref 0.8–5.3)
LYMPHOCYTES NFR BLD AUTO: 23 %
MAGNESIUM SERPL-MCNC: 2 MG/DL (ref 1.9–2.7)
MCH RBC QN AUTO: 30.1 PG (ref 26.5–33)
MCHC RBC AUTO-ENTMCNC: 36 G/DL (ref 31.5–36.5)
MCV RBC AUTO: 84 FL (ref 78–100)
MONOCYTES # BLD AUTO: 0.2 10E3/UL (ref 0–1.3)
MONOCYTES NFR BLD AUTO: 7 %
MUCOUS THREADS #/AREA URNS LPF: PRESENT /LPF
NEUTROPHILS # BLD AUTO: 1.8 10E3/UL (ref 1.6–8.3)
NEUTROPHILS NFR BLD AUTO: 70 %
NITRATE UR QL: NEGATIVE
NRBC # BLD AUTO: 0 10E3/UL
NRBC BLD AUTO-RTO: 0 /100
PH UR STRIP: 6.5 [PH] (ref 5–9)
PLATELET # BLD AUTO: 106 10E3/UL (ref 150–450)
POTASSIUM BLD-SCNC: 3.6 MMOL/L (ref 3.5–5.1)
PROT SERPL-MCNC: 6.9 G/DL (ref 6.4–8.9)
RBC # BLD AUTO: 4.52 10E6/UL (ref 3.8–5.2)
RBC URINE: 4 /HPF
SODIUM SERPL-SCNC: 137 MMOL/L (ref 134–144)
SP GR UR STRIP: 1.02 (ref 1–1.03)
SQUAMOUS EPITHELIAL: 3 /HPF
UROBILINOGEN UR STRIP-MCNC: >=8 MG/DL
WBC # BLD AUTO: 2.6 10E3/UL (ref 4–11)
WBC URINE: 3 /HPF

## 2021-11-15 PROCEDURE — 81025 URINE PREGNANCY TEST: CPT | Performed by: PHYSICIAN ASSISTANT

## 2021-11-15 PROCEDURE — 96376 TX/PRO/DX INJ SAME DRUG ADON: CPT | Performed by: PHYSICIAN ASSISTANT

## 2021-11-15 PROCEDURE — 250N000011 HC RX IP 250 OP 636: Performed by: PHYSICIAN ASSISTANT

## 2021-11-15 PROCEDURE — 96375 TX/PRO/DX INJ NEW DRUG ADDON: CPT | Performed by: PHYSICIAN ASSISTANT

## 2021-11-15 PROCEDURE — 80053 COMPREHEN METABOLIC PANEL: CPT | Performed by: PHYSICIAN ASSISTANT

## 2021-11-15 PROCEDURE — 99284 EMERGENCY DEPT VISIT MOD MDM: CPT | Mod: 25 | Performed by: PHYSICIAN ASSISTANT

## 2021-11-15 PROCEDURE — 83690 ASSAY OF LIPASE: CPT | Performed by: PHYSICIAN ASSISTANT

## 2021-11-15 PROCEDURE — 96361 HYDRATE IV INFUSION ADD-ON: CPT | Performed by: PHYSICIAN ASSISTANT

## 2021-11-15 PROCEDURE — 81001 URINALYSIS AUTO W/SCOPE: CPT | Performed by: PHYSICIAN ASSISTANT

## 2021-11-15 PROCEDURE — 85004 AUTOMATED DIFF WBC COUNT: CPT | Performed by: PHYSICIAN ASSISTANT

## 2021-11-15 PROCEDURE — 36415 COLL VENOUS BLD VENIPUNCTURE: CPT | Performed by: PHYSICIAN ASSISTANT

## 2021-11-15 PROCEDURE — 250N000013 HC RX MED GY IP 250 OP 250 PS 637: Performed by: PHYSICIAN ASSISTANT

## 2021-11-15 PROCEDURE — 83735 ASSAY OF MAGNESIUM: CPT | Performed by: PHYSICIAN ASSISTANT

## 2021-11-15 PROCEDURE — 258N000003 HC RX IP 258 OP 636: Performed by: PHYSICIAN ASSISTANT

## 2021-11-15 PROCEDURE — 99283 EMERGENCY DEPT VISIT LOW MDM: CPT | Performed by: PHYSICIAN ASSISTANT

## 2021-11-15 PROCEDURE — 83605 ASSAY OF LACTIC ACID: CPT | Performed by: PHYSICIAN ASSISTANT

## 2021-11-15 PROCEDURE — 96374 THER/PROPH/DIAG INJ IV PUSH: CPT | Performed by: PHYSICIAN ASSISTANT

## 2021-11-15 RX ORDER — METOCLOPRAMIDE 5 MG/1
5 TABLET ORAL 3 TIMES DAILY PRN
Qty: 9 TABLET | Refills: 0 | Status: SHIPPED | OUTPATIENT
Start: 2021-11-15 | End: 2021-11-18

## 2021-11-15 RX ORDER — METOCLOPRAMIDE HYDROCHLORIDE 5 MG/ML
10 INJECTION INTRAMUSCULAR; INTRAVENOUS ONCE
Status: DISCONTINUED | OUTPATIENT
Start: 2021-11-15 | End: 2021-11-15 | Stop reason: HOSPADM

## 2021-11-15 RX ORDER — ACETAMINOPHEN 500 MG
500 TABLET ORAL ONCE
Status: COMPLETED | OUTPATIENT
Start: 2021-11-15 | End: 2021-11-15

## 2021-11-15 RX ORDER — METOCLOPRAMIDE HYDROCHLORIDE 5 MG/ML
10 INJECTION INTRAMUSCULAR; INTRAVENOUS ONCE
Status: COMPLETED | OUTPATIENT
Start: 2021-11-15 | End: 2021-11-15

## 2021-11-15 RX ORDER — DIPHENHYDRAMINE HYDROCHLORIDE 50 MG/ML
25 INJECTION INTRAMUSCULAR; INTRAVENOUS ONCE
Status: COMPLETED | OUTPATIENT
Start: 2021-11-15 | End: 2021-11-15

## 2021-11-15 RX ORDER — KETOROLAC TROMETHAMINE 15 MG/ML
15 INJECTION, SOLUTION INTRAMUSCULAR; INTRAVENOUS ONCE
Status: COMPLETED | OUTPATIENT
Start: 2021-11-15 | End: 2021-11-15

## 2021-11-15 RX ADMIN — METOCLOPRAMIDE 10 MG: 5 INJECTION, SOLUTION INTRAMUSCULAR; INTRAVENOUS at 16:08

## 2021-11-15 RX ADMIN — SODIUM CHLORIDE 1000 ML: 9 INJECTION, SOLUTION INTRAVENOUS at 16:06

## 2021-11-15 RX ADMIN — KETOROLAC TROMETHAMINE 15 MG: 15 INJECTION, SOLUTION INTRAMUSCULAR; INTRAVENOUS at 18:14

## 2021-11-15 RX ADMIN — ACETAMINOPHEN 500 MG: 500 TABLET, FILM COATED ORAL at 16:38

## 2021-11-15 RX ADMIN — DIPHENHYDRAMINE HYDROCHLORIDE 25 MG: 50 INJECTION INTRAMUSCULAR; INTRAVENOUS at 16:07

## 2021-11-15 ASSESSMENT — MIFFLIN-ST. JEOR: SCORE: 1453.49

## 2021-11-15 ASSESSMENT — ENCOUNTER SYMPTOMS
CHEST TIGHTNESS: 0
BRUISES/BLEEDS EASILY: 0
CHILLS: 0
SHORTNESS OF BREATH: 0
ABDOMINAL PAIN: 1
NAUSEA: 1
VOMITING: 1
WOUND: 0
BACK PAIN: 0
DIARRHEA: 0
HEMATURIA: 0
CONFUSION: 0
FEVER: 0

## 2021-11-15 NOTE — ED NOTES
Verbalizes understanding with discharge instructions. States feeling better. Tylenol provided for fever

## 2021-11-15 NOTE — ED TRIAGE NOTES
"ED Nursing Triage Note (General)   ________________________________    Fernandez Iverson is a 26 year old Female that presents to triage private car  With history of  Abdominal pain last 4 days that will not go away and vomiting reported by patient. Unable to hold anything down  Significant symptoms had onset past 4daysBP 124/71   Pulse (!) 124   Temp (!) 102.5  F (39.2  C) (Tympanic)   Resp 20   Ht 1.562 m (5' 1.5\")   Wt 78 kg (172 lb)   LMP 10/27/2021 (Exact Date)   SpO2 99%   BMI 31.97 kg/m  t  Patient appears alert , in mild distress., and cooperative behavior.    GCS Total = 15  Airway: intact  Breathing noted as Normal  Circulation Normal  Skin:  Normal  Action taken:  Triage to critical care immediately      PRE HOSPITAL PR[facility]IVING SITUATION Alone  "

## 2021-11-15 NOTE — ED PROVIDER NOTES
History     Chief Complaint   Patient presents with     Abdominal Pain     HPI  Fernandez Iverson is a 26 year old female who was diagnosed with Covid 5 days ago.  She says for about the last 4 days she has been having abdominal pain nausea and vomiting.  She states she is only keeping ice chips down when she is tries a few of those, however nothing else is staying down.  Pain is currently a 5 out of 10 and she points to her mid to left upper abdomen.  No bowel movement in a couple days, but she has not been eating much.  Urine seems normal.  Has been running fevers, she is 102.5 here now.  She believes her respiratory symptoms are improving, she is really not coughing much or feeling short of breath.    Allergies:  Allergies   Allergen Reactions     Sulfa Drugs Hives       Problem List:    Patient Active Problem List    Diagnosis Date Noted     Psoriasis 09/02/2011     Priority: Medium     Plantar wart 05/20/2011     Priority: Medium        Past Medical History:    Past Medical History:   Diagnosis Date     Encounter for general adult medical examination without abnormal findings      Overweight        Past Surgical History:    History reviewed. No pertinent surgical history.    Family History:    History reviewed. No pertinent family history.    Social History:  Marital Status:  Single [1]  Social History     Tobacco Use     Smoking status: Passive Smoke Exposure - Never Smoker     Smokeless tobacco: Never Used     Tobacco comment: Quit smoking: occasional   Vaping Use     Vaping Use: Never used   Substance Use Topics     Alcohol use: Yes     Comment: social      Drug use: Never     Comment: Drug use: No        Medications:    ondansetron (ZOFRAN-ODT) 4 MG ODT tab  acetic acid-hydrocortisone (VOSOL-HC) 1-2 % otic solution  cetirizine (ZYRTEC) 10 MG tablet  fluticasone (FLONASE) 50 MCG/ACT nasal spray  levonorgestrel-ethinyl estradiol (NORDETTE) 0.15-30 MG-MCG tablet          Review of Systems   Constitutional:  "Negative for chills and fever.   HENT: Negative for congestion.    Eyes: Negative for visual disturbance.   Respiratory: Negative for chest tightness and shortness of breath.    Cardiovascular: Negative for chest pain.   Gastrointestinal: Positive for abdominal pain, nausea and vomiting. Negative for diarrhea.   Genitourinary: Negative for dysuria.   Musculoskeletal: Negative for arthralgias.   Skin: Negative for rash.   Neurological: Negative for light-headedness.   Psychiatric/Behavioral: Negative for agitation.       Physical Exam   BP: 124/71  Pulse: (!) 124  Temp: (!) 102.5  F (39.2  C)  Resp: 20  Height: 156.2 cm (5' 1.5\")  Weight: 78 kg (172 lb)  SpO2: 99 %      Physical Exam  Vitals and nursing note reviewed.   Constitutional:       Appearance: She is well-developed.   HENT:      Head: Normocephalic and atraumatic.      Mouth/Throat:      Mouth: Mucous membranes are moist.   Eyes:      Conjunctiva/sclera: Conjunctivae normal.   Cardiovascular:      Rate and Rhythm: Normal rate and regular rhythm.      Heart sounds: Normal heart sounds.   Pulmonary:      Effort: Pulmonary effort is normal.      Breath sounds: Normal breath sounds.   Abdominal:      General: Abdomen is flat. Bowel sounds are normal. There is no distension.      Palpations: Abdomen is soft.      Tenderness: There is no abdominal tenderness.   Skin:     General: Skin is warm and dry.   Neurological:      Mental Status: She is alert and oriented to person, place, and time.   Psychiatric:         Mood and Affect: Mood normal.         Behavior: Behavior normal.         ED Course        Procedures                Results for orders placed or performed during the hospital encounter of 11/14/21 (from the past 24 hour(s))   CBC with platelets differential    Narrative    The following orders were created for panel order CBC with platelets differential.  Procedure                               Abnormality         Status                     ---------      "                          -----------         ------                     CBC with platelets and d...[[id number]]  Abnormal            Final result                 Please view results for these tests on the individual orders.   Comprehensive metabolic panel   Result Value Ref Range    Sodium 135 134 - 144 mmol/L    Potassium 3.8 3.5 - 5.1 mmol/L    Chloride 99 98 - 107 mmol/L    Carbon Dioxide (CO2) 23 21 - 31 mmol/L    Anion Gap 13 3 - 14 mmol/L    Urea Nitrogen 12 7 - 25 mg/dL    Creatinine 1.00 0.60 - 1.20 mg/dL    Calcium 9.4 8.6 - 10.3 mg/dL    Glucose 119 (H) 70 - 105 mg/dL    Alkaline Phosphatase 34 34 - 104 U/L    AST 24 13 - 39 U/L    ALT 17 7 - 52 U/L    Protein Total 7.6 6.4 - 8.9 g/dL    Albumin 4.4 3.5 - 5.7 g/dL    Bilirubin Total 0.8 0.3 - 1.0 mg/dL    GFR Estimate 78 >60 mL/min/1.73m2   CBC with platelets and differential   Result Value Ref Range    WBC Count 3.2 (L) 4.0 - 11.0 10e3/uL    RBC Count 4.87 3.80 - 5.20 10e6/uL    Hemoglobin 14.3 11.7 - 15.7 g/dL    Hematocrit 40.2 35.0 - 47.0 %    MCV 83 78 - 100 fL    MCH 29.4 26.5 - 33.0 pg    MCHC 35.6 31.5 - 36.5 g/dL    RDW 11.6 10.0 - 15.0 %    Platelet Count 116 (L) 150 - 450 10e3/uL    % Neutrophils 70 %    % Lymphocytes 22 %    % Monocytes 8 %    % Eosinophils 0 %    % Basophils 0 %    % Immature Granulocytes 0 %    NRBCs per 100 WBC 0 <1 /100    Absolute Neutrophils 2.2 1.6 - 8.3 10e3/uL    Absolute Lymphocytes 0.7 (L) 0.8 - 5.3 10e3/uL    Absolute Monocytes 0.3 0.0 - 1.3 10e3/uL    Absolute Eosinophils 0.0 0.0 - 0.7 10e3/uL    Absolute Basophils 0.0 0.0 - 0.2 10e3/uL    Absolute Immature Granulocytes 0.0 <=0.0 10e3/uL    Absolute NRBCs 0.0 10e3/uL       Medications   0.9% sodium chloride BOLUS (0 mLs Intravenous Stopped 11/14/21 2236)     Followed by   sodium chloride 0.9% infusion ( Intravenous Canceled Entry 11/14/21 2236)   ondansetron (ZOFRAN) injection 4 mg (4 mg Intravenous Given 11/14/21 2129)   acetaminophen (TYLENOL) tablet 1,000 mg (has  no administration in time range)       Assessments & Plan (with Medical Decision Making)     I have reviewed the nursing notes.    I have reviewed the findings, diagnosis, plan and need for follow up with the patient.  Feeling better with above interventions.  Labs do show a low white count and platelets, likely due to viral infection.  We will send her home with some Zofran.  Return if worse.    New Prescriptions    ONDANSETRON (ZOFRAN-ODT) 4 MG ODT TAB    Take 1 tablet (4 mg) by mouth every 8 hours as needed for nausea       Final diagnoses:   Gastroenteritis   Infection due to 2019 novel coronavirus       11/14/2021   [facility]     Justice Desouza MD  11/14/21 [id number]

## 2021-11-15 NOTE — ED TRIAGE NOTES
"ED Nursing Triage Note (General)   ________________________________    Fernandez Iverson is a 26 year old Female that presents to triage private car  With history of  abd pain 3/4 and vomiting (once) was seen last night for same symptoms. \"zofran hasn't helped\" last taken at 1400, vomited at 0900 reported by patient   Significant symptoms had onset 5 day(s) ago.  /76   Pulse 112   Temp (!) 100.7  F (38.2  C) (Tympanic)   Resp 16   Ht 1.549 m (5' 1\")   Wt 77.1 kg (170 lb)   LMP 10/27/2021 (Exact Date)   SpO2 99%   BMI 32.12 kg/m  t  Patient appears alert  and oriented, in no acute distress., and cooperative and calm behavior.    GCS Total = 15  Airway: intact  Breathing noted as Normal  Circulation Normal  Skin:  Normal        PRE HOSPITAL [facility]IVING SITUATION home  "

## 2021-11-15 NOTE — TELEPHONE ENCOUNTER
"Fernandez gave permission to talk with mom regarding her health.    Last 4.5 days unable to keep any foods down, sever cramping    Throwing up continually    No temp    Plans to go to ED per protocol.    Stella Deleon RN   11/14/21 8:10 PM  [facility] Nurse Advisor    Reason for Disposition    [1] SEVERE vomiting (e.g., 6 or more times/day) AND [2] present > 8 hours    Additional Information    Negative: Vomiting occurs only while coughing    Negative: [1] Pregnant < 20 Weeks AND [2] nausea/vomiting began in early pregnancy (i.e., 4-8 weeks pregnant)    Negative: Chest pain    Negative: Headache is main symptom    Negative: Vomiting (or Nausea) in a cancer patient who is currently (or recently) receiving chemotherapy or radiation therapy, or cancer patient who has metastatic or end-stage cancer and is receiving palliative care    Negative: [1] Vomiting AND [2] contains red blood or black (\"coffee ground\") material  (Exception: few red streaks in vomit that only happened once)    Negative: Recent head injury (within last 3 days)    Negative: Recent abdominal injury (within last 3 days)    Negative: [1] Insulin-dependent diabetes (Type I) AND [2] glucose > 400 mg/dl (22 mmol/l)    Negative: Shock suspected (e.g., cold/pale/clammy skin, too weak to stand, low BP, rapid pulse)    Negative: Difficult to awaken or acting confused (e.g., disoriented, slurred speech)    Negative: Sounds like a life-threatening emergency to the triager    Negative: Severe pain in one eye    Protocols used: VOMITING-A-AH      "

## 2021-11-15 NOTE — ED PROVIDER NOTES
History     Chief Complaint   Patient presents with     Abdominal Pain     HPI  Fernandez Iverson is a 26 year old female who presents to the ED for evaluation of abdominal pain. She was diagnosed with Covid roughly 6 days ago. Over the last few days has had some epigastric discomfort with some episodes of vomiting. She was evaluated in the emergency department last night and sent home with some Zofran. She still has some nausea occurring. She denies any chest pain, shortness of breath, cough or fevers. She denies dysuria or diarrhea.    Allergies:  Allergies   Allergen Reactions     Sulfa Drugs Hives       Problem List:    Patient Active Problem List    Diagnosis Date Noted     Psoriasis 09/02/2011     Priority: Medium     Plantar wart 05/20/2011     Priority: Medium        Past Medical History:    Past Medical History:   Diagnosis Date     Encounter for general adult medical examination without abnormal findings      Overweight        Past Surgical History:    No past surgical history on file.    Family History:    No family history on file.    Social History:  Marital Status:  Single [1]  Social History     Tobacco Use     Smoking status: Passive Smoke Exposure - Never Smoker     Smokeless tobacco: Never Used     Tobacco comment: Quit smoking: occasional   Vaping Use     Vaping Use: Never used   Substance Use Topics     Alcohol use: Yes     Comment: social      Drug use: Never     Comment: Drug use: No        Medications:    cetirizine (ZYRTEC) 10 MG tablet  fluticasone (FLONASE) 50 MCG/ACT nasal spray  levonorgestrel-ethinyl estradiol (NORDETTE) 0.15-30 MG-MCG tablet  metoclopramide (REGLAN) 5 MG tablet  ondansetron (ZOFRAN-ODT) 4 MG ODT tab          Review of Systems   Constitutional: Negative for chills and fever.   HENT: Negative for congestion.    Eyes: Negative for visual disturbance.   Respiratory: Negative for chest tightness and shortness of breath.    Cardiovascular: Negative for chest pain.  "  Gastrointestinal: Positive for abdominal pain, nausea and vomiting. Negative for diarrhea.   Genitourinary: Negative for hematuria.   Musculoskeletal: Negative for back pain.   Skin: Negative for rash and wound.   Neurological: Negative for syncope.   Hematological: Does not bruise/bleed easily.   Psychiatric/Behavioral: Negative for confusion.       Physical Exam   BP: 121/76  Pulse: 112  Temp: (!) 100.7  F (38.2  C)  Resp: 16  Height: 154.9 cm (5' 1\")  Weight: 77.1 kg (170 lb)  SpO2: 99 %      Physical Exam  Constitutional:       General: She is not in acute distress.     Appearance: She is well-developed. She is not diaphoretic.   HENT:      Head: Normocephalic and atraumatic.   Eyes:      General: No scleral icterus.     Conjunctiva/sclera: Conjunctivae normal.   Cardiovascular:      Rate and Rhythm: Normal rate and regular rhythm.   Pulmonary:      Effort: Pulmonary effort is normal.      Breath sounds: Normal breath sounds.   Abdominal:      Palpations: Abdomen is soft.      Tenderness: There is no abdominal tenderness.   Musculoskeletal:         General: No deformity.      Cervical back: Neck supple.   Lymphadenopathy:      Cervical: No cervical adenopathy.   Skin:     General: Skin is warm and dry.      Coloration: Skin is not jaundiced.      Findings: No rash.   Neurological:      Mental Status: She is alert and oriented to person, place, and time. Mental status is at baseline.   Psychiatric:         Mood and Affect: Mood normal.         Behavior: Behavior normal.         ED Course        Procedures              Critical Care time:  none               Results for orders placed or performed during the hospital encounter of 11/15/21 (from the past 24 hour(s))   CBC with platelets differential    Narrative    The following orders were created for panel order CBC with platelets differential.  Procedure                               Abnormality         Status                     ---------                       "         -----------         ------                     CBC with platelets and d...[[id number]]  Abnormal            Final result                 Please view results for these tests on the individual orders.   Comprehensive metabolic panel   Result Value Ref Range    Sodium 137 134 - 144 mmol/L    Potassium 3.6 3.5 - 5.1 mmol/L    Chloride 102 98 - 107 mmol/L    Carbon Dioxide (CO2) 25 21 - 31 mmol/L    Anion Gap 10 3 - 14 mmol/L    Urea Nitrogen 9 7 - 25 mg/dL    Creatinine 0.86 0.60 - 1.20 mg/dL    Calcium 8.9 8.6 - 10.3 mg/dL    Glucose 103 70 - 105 mg/dL    Alkaline Phosphatase 30 (L) 34 - 104 U/L    AST 23 13 - 39 U/L    ALT 17 7 - 52 U/L    Protein Total 6.9 6.4 - 8.9 g/dL    Albumin 3.9 3.5 - 5.7 g/dL    Bilirubin Total 0.7 0.3 - 1.0 mg/dL    GFR Estimate >90 >60 mL/min/1.73m2   Lipase   Result Value Ref Range    Lipase 89 (H) 11 - 82 U/L   Lactic acid whole blood   Result Value Ref Range    Lactic Acid 1.0 0.7 - 2.0 mmol/L   Magnesium   Result Value Ref Range    Magnesium 2.0 1.9 - 2.7 mg/dL   CBC with platelets and differential   Result Value Ref Range    WBC Count 2.6 (L) 4.0 - 11.0 10e3/uL    RBC Count 4.52 3.80 - 5.20 10e6/uL    Hemoglobin 13.6 11.7 - 15.7 g/dL    Hematocrit 37.8 35.0 - 47.0 %    MCV 84 78 - 100 fL    MCH 30.1 26.5 - 33.0 pg    MCHC 36.0 31.5 - 36.5 g/dL    RDW 11.8 10.0 - 15.0 %    Platelet Count 106 (L) 150 - 450 10e3/uL    % Neutrophils 70 %    % Lymphocytes 23 %    % Monocytes 7 %    % Eosinophils 0 %    % Basophils 0 %    % Immature Granulocytes 0 %    NRBCs per 100 WBC 0 <1 /100    Absolute Neutrophils 1.8 1.6 - 8.3 10e3/uL    Absolute Lymphocytes 0.6 (L) 0.8 - 5.3 10e3/uL    Absolute Monocytes 0.2 0.0 - 1.3 10e3/uL    Absolute Eosinophils 0.0 0.0 - 0.7 10e3/uL    Absolute Basophils 0.0 0.0 - 0.2 10e3/uL    Absolute Immature Granulocytes 0.0 <=0.0 10e3/uL    Absolute NRBCs 0.0 10e3/uL   UA with Microscopic reflex to Culture    Specimen: Urine, Midstream   Result Value Ref Range     Color Urine Yellow Colorless, Straw, Light Yellow, Yellow    Appearance Urine Clear Clear    Glucose Urine Negative Negative mg/dL    Bilirubin Urine Moderate (A) Negative    Ketones Urine >=80 (A) Negative mg/dL    Specific Gravity Urine 1.025 1.005 - 1.030    Blood Urine Negative Negative    pH Urine 6.5 5.0 - 9.0    Protein Albumin Urine 30  (A) Negative mg/dL    Urobilinogen Urine >=8.0 (A) Normal, 2.0 mg/dL    Nitrite Urine Negative Negative    Leukocyte Esterase Urine Negative Negative    Bacteria Urine Few (A) None Seen /HPF    Mucus Urine Present (A) None Seen /LPF    RBC Urine 4 (H) <=2 /HPF    WBC Urine 3 <=5 /HPF    Squamous Epithelials Urine 3 (H) <=1 /HPF    Narrative    Urine Culture not indicated   HCG qualitative urine (UPT)   Result Value Ref Range    hCG Urine Qualitative Negative Negative       Medications   metoclopramide (REGLAN) injection 10 mg (0 mg Intravenous Hold 11/15/21 1814)   0.9% sodium chloride BOLUS (0 mLs Intravenous Stopped 11/15/21 1820)   metoclopramide (REGLAN) injection 10 mg (10 mg Intravenous Given 11/15/21 1608)   diphenhydrAMINE (BENADRYL) injection 25 mg (25 mg Intravenous Given 11/15/21 1607)   acetaminophen (TYLENOL) tablet 500 mg (500 mg Oral Given 11/15/21 1638)   ketorolac (TORADOL) injection 15 mg (15 mg Intravenous Given 11/15/21 1814)       Assessments & Plan (with Medical Decision Making)   Pt nontoxic in NAD. Heart, lung, bowel sounds normal, abd soft, nontender to palpation, nondistended. VSS, she is febrile.     Slight increase in lipase, otherwise lab work appears consistent with COVID illness. She does have some ketones in UA.     I do discuss obtaining further imaging studies with her, however, she declines imaging today, which seems somewhat reasonable given her lack of focal tenderness. I have a low suspicion for a surgical abdomen.     She is given fluids, reglan and benadryl.     Upon reassessment she reports much improvement from when arriving. We will  send her home with some Reglan and she will continue conservative treatment.     Strict return precautions are given to the pt, they will return if symptoms are worsening or concerning. The pt understands and agrees with the plan and they are discharged.     Jonnathan Arechiga PA-C        I have reviewed the nursing notes.    I have reviewed the findings, diagnosis, plan and need for follow up with the patient.       Discharge Medication List as of 11/15/2021  6:21 PM      START taking these medications    Details   metoclopramide (REGLAN) 5 MG tablet Take 1 tablet (5 mg) by mouth 3 times daily as needed (nausea), Disp-9 tablet, R-0, E-Prescribe             Final diagnoses:   Infection due to 2019 novel coronavirus   Nausea       11/15/2021   [facility] AND \[facility]\""     Jonnathan Arechiga PA  11/15/21 1937     Detail Level: Detailed Post-Care Instructions: I reviewed with the patient in detail post-care instructions. Patient is to wear sunprotection, and avoid picking at any of the treated lesions. Pt may apply Vaseline to crusted or scabbing areas. Render Note In Bullet Format When Appropriate: No Medical Necessity Information: It is in your best interest to select a reason for this procedure from the list below. All of these items fulfill various CMS LCD requirements except the new and changing color options. Consent: The patient's consent was obtained including but not limited to risks of crusting, scabbing, blistering, scarring, darker or lighter pigmentary change, recurrence, incomplete removal and infection. Anesthesia Volume In Cc: 0.5 Render Post-Care Instructions In Note?: yes Medical Necessity Clause: This procedure was medically necessary because the lesions that were treated were: Treatment Number (Will Not Render If 0): 0

## 2021-11-16 ENCOUNTER — APPOINTMENT (OUTPATIENT)
Dept: GENERAL RADIOLOGY | Facility: OTHER | Age: 26
End: 2021-11-16
Attending: PHYSICIAN ASSISTANT
Payer: COMMERCIAL

## 2021-11-16 ENCOUNTER — HOSPITAL ENCOUNTER (EMERGENCY)
Facility: OTHER | Age: 26
Discharge: HOME | End: 2021-11-16
Attending: PHYSICIAN ASSISTANT | Admitting: PHYSICIAN ASSISTANT
Payer: COMMERCIAL

## 2021-11-16 VITALS
HEIGHT: 61 IN | RESPIRATION RATE: 15 BRPM | SYSTOLIC BLOOD PRESSURE: 128 MMHG | OXYGEN SATURATION: 96 % | TEMPERATURE: 100 F | WEIGHT: 169 LBS | HEART RATE: 119 BPM | DIASTOLIC BLOOD PRESSURE: 76 MMHG | BODY MASS INDEX: 31.91 KG/M2

## 2021-11-16 DIAGNOSIS — J12.82 PNEUMONIA DUE TO 2019 NOVEL CORONAVIRUS: ICD-10-CM

## 2021-11-16 DIAGNOSIS — U07.1 INFECTION DUE TO 2019 NOVEL CORONAVIRUS: ICD-10-CM

## 2021-11-16 DIAGNOSIS — U07.1 PNEUMONIA DUE TO 2019 NOVEL CORONAVIRUS: ICD-10-CM

## 2021-11-16 DIAGNOSIS — J18.9 MULTIFOCAL PNEUMONIA: ICD-10-CM

## 2021-11-16 PROCEDURE — 99283 EMERGENCY DEPT VISIT LOW MDM: CPT | Mod: 25 | Performed by: PHYSICIAN ASSISTANT

## 2021-11-16 PROCEDURE — 250N000013 HC RX MED GY IP 250 OP 250 PS 637: Performed by: PHYSICIAN ASSISTANT

## 2021-11-16 PROCEDURE — 250N000009 HC RX 250: Performed by: PHYSICIAN ASSISTANT

## 2021-11-16 PROCEDURE — 99283 EMERGENCY DEPT VISIT LOW MDM: CPT | Performed by: PHYSICIAN ASSISTANT

## 2021-11-16 PROCEDURE — 71045 X-RAY EXAM CHEST 1 VIEW: CPT

## 2021-11-16 RX ORDER — ALBUTEROL SULFATE 90 UG/1
6 AEROSOL, METERED RESPIRATORY (INHALATION) ONCE
Status: COMPLETED | OUTPATIENT
Start: 2021-11-16 | End: 2021-11-16

## 2021-11-16 RX ORDER — LORAZEPAM 0.5 MG/1
0.5 TABLET ORAL ONCE
Status: COMPLETED | OUTPATIENT
Start: 2021-11-16 | End: 2021-11-16

## 2021-11-16 RX ORDER — ALBUTEROL SULFATE 90 UG/1
2 AEROSOL, METERED RESPIRATORY (INHALATION) EVERY 6 HOURS PRN
Qty: 18 G | Refills: 1 | Status: SHIPPED | OUTPATIENT
Start: 2021-11-16

## 2021-11-16 RX ORDER — AZITHROMYCIN 250 MG/1
500 TABLET, FILM COATED ORAL ONCE
Status: COMPLETED | OUTPATIENT
Start: 2021-11-16 | End: 2021-11-16

## 2021-11-16 RX ORDER — AZITHROMYCIN 250 MG/1
TABLET, FILM COATED ORAL
Qty: 6 TABLET | Refills: 0 | Status: SHIPPED | OUTPATIENT
Start: 2021-11-16

## 2021-11-16 RX ORDER — PREDNISONE 20 MG/1
TABLET ORAL
Qty: 15 TABLET | Refills: 0 | Status: SHIPPED | OUTPATIENT
Start: 2021-11-16

## 2021-11-16 RX ORDER — DEXAMETHASONE SODIUM PHOSPHATE 4 MG/ML
10 VIAL (ML) INJECTION ONCE
Status: COMPLETED | OUTPATIENT
Start: 2021-11-16 | End: 2021-11-16

## 2021-11-16 RX ORDER — ACETAMINOPHEN 500 MG
500 TABLET ORAL ONCE
Status: COMPLETED | OUTPATIENT
Start: 2021-11-16 | End: 2021-11-16

## 2021-11-16 RX ADMIN — ACETAMINOPHEN 500 MG: 500 TABLET, FILM COATED ORAL at 18:44

## 2021-11-16 RX ADMIN — DEXAMETHASONE SODIUM PHOSPHATE 10 MG: 4 INJECTION, SOLUTION INTRAMUSCULAR; INTRAVENOUS at 18:43

## 2021-11-16 RX ADMIN — AZITHROMYCIN DIHYDRATE 500 MG: 250 TABLET, FILM COATED ORAL at 19:51

## 2021-11-16 RX ADMIN — ALBUTEROL SULFATE 6 PUFF: 90 AEROSOL, METERED RESPIRATORY (INHALATION) at 18:43

## 2021-11-16 RX ADMIN — LORAZEPAM 0.5 MG: 0.5 TABLET ORAL at 19:23

## 2021-11-16 ASSESSMENT — ENCOUNTER SYMPTOMS
CHEST TIGHTNESS: 0
COUGH: 1
BRUISES/BLEEDS EASILY: 0
SHORTNESS OF BREATH: 1
FATIGUE: 1
NAUSEA: 0
WOUND: 0
FEVER: 1
MYALGIAS: 1
CONFUSION: 0
ABDOMINAL PAIN: 0
HEMATURIA: 0
VOMITING: 0
CHILLS: 0
CONSTIPATION: 0
WEAKNESS: 1
DIARRHEA: 0
BACK PAIN: 0

## 2021-11-16 ASSESSMENT — MIFFLIN-ST. JEOR: SCORE: 1448.96

## 2021-11-16 NOTE — DISCHARGE INSTRUCTIONS
Get plenty of fluids and rest.  As we discussed all of your lab work appears very stable as is your physical exam and vital signs.  Continue to alternate Tylenol and ibuprofen to help with fever and comfort control.  I did prescribe you some Reglan that you can try to see if that will help your nausea.  Please return if there are worsening or concerning symptoms otherwise follow-up with PCP as needed.

## 2021-11-17 NOTE — ED TRIAGE NOTES
"ED Nursing Triage Note (General)   ________________________________    Fernandez Iverson is a 26 year old Female that presents to triage private car  With history of  covid x 10 days and having sudden onset of shortness of breath reported by patient   Significant symptoms had onset 2 hour(s) ago.  /76   Pulse 119   Temp 100  F (37.8  C) (Tympanic)   Resp 15   Ht 1.549 m (5' 1\")   Wt 76.7 kg (169 lb)   LMP 10/27/2021 (Exact Date)   SpO2 96%   Breastfeeding No   BMI 31.93 kg/m  t  Patient appears alert , in moderate distress., and cooperative behavior.    GCS Total = 15  Airway: intact  Breathing noted as Normal  Circulation Normal  Skin:  Normal  Action taken:  to room      PRE HOSPITAL PRIOR LIVING SITUATION Parents/Siblings  "

## 2021-11-17 NOTE — ED PROVIDER NOTES
History     Chief Complaint   Patient presents with     Covid Concern     Shortness of Breath     HPI  Fernandez Iverson is a 26 year old female who was diagnosed with Covid on 11/9/2021.  She has been having increasing body aches, fatigue and weakness since then.  She has had a persistent cough.  Denies any other issues she has not.  Been taking anything for her body aches.  Her temp is noted to be 100 today.  Denies any other issues at this time.    Allergies:  Allergies   Allergen Reactions     Sulfa Drugs Hives       Problem List:    Patient Active Problem List    Diagnosis Date Noted     Psoriasis 09/02/2011     Priority: Medium     Plantar wart 05/20/2011     Priority: Medium        Past Medical History:    Past Medical History:   Diagnosis Date     Encounter for general adult medical examination without abnormal findings      Overweight        Past Surgical History:    History reviewed. No pertinent surgical history.    Family History:    History reviewed. No pertinent family history.    Social History:  Marital Status:  Single [1]  Social History     Tobacco Use     Smoking status: Passive Smoke Exposure - Never Smoker     Smokeless tobacco: Never Used     Tobacco comment: Quit smoking: occasional   Vaping Use     Vaping Use: Never used   Substance Use Topics     Alcohol use: Yes     Comment: social      Drug use: Never     Comment: Drug use: No        Medications:    cetirizine (ZYRTEC) 10 MG tablet  fluticasone (FLONASE) 50 MCG/ACT nasal spray  levonorgestrel-ethinyl estradiol (NORDETTE) 0.15-30 MG-MCG tablet  metoclopramide (REGLAN) 5 MG tablet  ondansetron (ZOFRAN-ODT) 4 MG ODT tab          Review of Systems   Constitutional: Positive for fatigue and fever. Negative for chills.   HENT: Negative for congestion.    Eyes: Negative for visual disturbance.   Respiratory: Positive for cough and shortness of breath. Negative for chest tightness.    Cardiovascular: Negative for chest pain.   Gastrointestinal:  "Negative for abdominal pain, constipation, diarrhea, nausea and vomiting.   Genitourinary: Negative for hematuria.   Musculoskeletal: Positive for myalgias. Negative for back pain.   Skin: Negative for rash and wound.   Neurological: Positive for weakness. Negative for syncope.   Hematological: Does not bruise/bleed easily.   Psychiatric/Behavioral: Negative for confusion.   All other systems reviewed and are negative.      Physical Exam   BP: 128/76  Pulse: 119  Temp: 100  F (37.8  C)  Resp: 15  Height: 154.9 cm (5' 1\")  Weight: 76.7 kg (169 lb)  SpO2: 96 %    Vitals:    11/16/21 1759   BP: 128/76   Pulse: 119   Resp: 15   Temp: 100  F (37.8  C)   TempSrc: Tympanic   SpO2: 96%   Weight: 76.7 kg (169 lb)   Height: 1.549 m (5' 1\")       Physical Exam  Vitals and nursing note reviewed.   Constitutional:       General: She is not in acute distress.     Appearance: Normal appearance. She is not ill-appearing or toxic-appearing.   HENT:      Head: Normocephalic. No raccoon eyes, right periorbital erythema or left periorbital erythema.      Right Ear: No drainage or tenderness.      Left Ear: No drainage or tenderness.      Nose: Nose normal.   Eyes:      General: Lids are normal. Gaze aligned appropriately. No scleral icterus.     Extraocular Movements: Extraocular movements intact.   Neck:      Trachea: No tracheal deviation.   Cardiovascular:      Rate and Rhythm: Normal rate.   Pulmonary:      Effort: Pulmonary effort is normal. No respiratory distress.      Breath sounds: No stridor.   Abdominal:      Tenderness: There is no abdominal tenderness.   Musculoskeletal:         General: No deformity or signs of injury. Normal range of motion.      Cervical back: Normal range of motion. No signs of trauma.   Skin:     General: Skin is warm and dry.      Coloration: Skin is not jaundiced or pale.   Neurological:      General: No focal deficit present.      Mental Status: She is alert and oriented to person, place, and time. "      GCS: GCS eye subscore is 4. GCS verbal subscore is 5. GCS motor subscore is 6.      Motor: No tremor or seizure activity.   Psychiatric:         Attention and Perception: Attention normal.         Mood and Affect: Mood normal.         ED Course       Results for orders placed or performed during the hospital encounter of 11/16/21 (from the past 24 hour(s))   XR Chest Port 1 View    Narrative    Exam:  XR CHEST PORT 1 VIEW    HISTORY: covid positive.    COMPARISON:  None.    FINDINGS:     The cardiomediastinal contours are normal.      Low lung volumes. There are patchy opacities in the mid to lower lungs  bilaterally, left worse than right.    No pleural effusion or pneumothorax.      No acute osseous abnormality.       Impression    IMPRESSION:      Patchy opacities in the mid to lower lungs bilaterally, left worse  than right, compatible with multifocal pneumonia.      SIA MARTINO MD         SYSTEM ID:  [id number]       Medications   albuterol (PROAIR HFA/PROVENTIL HFA/VENTOLIN HFA) 108 (90 Base) MCG/ACT inhaler 6 puff (6 puffs Inhalation Given 11/16/21 1843)   dexamethasone (DECADRON) injectable solution used ORALLY 10 mg (10 mg Oral Given 11/16/21 1843)   acetaminophen (TYLENOL) tablet 500 mg (500 mg Oral Given 11/16/21 1844)   LORazepam (ATIVAN) tablet 0.5 mg (0.5 mg Oral Given 11/16/21 1923)   azithromycin (ZITHROMAX) tablet 500 mg (500 mg Oral Given 11/16/21 1951)       Assessments & Plan (with Medical Decision Making)     I have reviewed the nursing notes.    I have reviewed the findings, diagnosis, plan and need for follow up with the patient.      New Prescriptions    ALBUTEROL (PROAIR HFA/PROVENTIL HFA/VENTOLIN HFA) 108 (90 BASE) MCG/ACT INHALER    Inhale 2 puffs into the lungs every 6 hours as needed for shortness of breath / dyspnea or wheezing    AZITHROMYCIN (ZITHROMAX Z-KIMBERLY) 250 MG TABLET    Two tablets on the first day, then one tablet daily for the next 4 days    PREDNISONE (DELTASONE) 20  MG TABLET    Take two tablets (= 40mg) each day for 5 (five) days, then one tablet (20 mg) each day x 5 days until finished       Final diagnoses:   Infection due to 2019 novel coronavirus   Multifocal pneumonia   Pneumonia due to 2019 novel coronavirus     Febrile temp is 100.  Vital signs stable.  Patient with recent diagnosis of Covid.  Continued body aches and fatigue as well as a cough.  She was given a ProAir MDI, Decadron, Tylenol for fever reduction and Ativan due to some jaw pain.  She feels this helped.  Chest x-ray shows  Patchy opacities in the mid to lower lungs bilaterally, left worse than right, compatible with multifocal pneumonia.  Will treat empirically with azithromycin.  Patient was given the first dose in the ER.  Rx for albuterol, azithromycin and prednisone taper.  Continue to monitor symptoms return if there is any concerns for further evaluation as needed.  She can use Tylenol Motrin for fever reduction in for myalgias.  11/16/2021   [facility] AND [facility]     Jv Frances PA-C  11/16/21 2029

## 2022-01-22 DIAGNOSIS — N92.4 EXCESSIVE BLEEDING IN PREMENOPAUSAL PERIOD: ICD-10-CM

## 2022-01-24 RX ORDER — LEVONORGESTREL AND ETHINYL ESTRADIOL 0.15-0.03
KIT ORAL
Qty: 84 TABLET | Refills: 3 | Status: SHIPPED | OUTPATIENT
Start: 2022-01-24

## 2022-01-24 NOTE — TELEPHONE ENCOUNTER
"Rx approved per Prescription Refill Protocol requirements. Lottie Koroma RN on 1/24/2022 at 11:54 AM    Last Prescription Date: 2/19/2021  Last Qty/Refills: 84 / 3  Last Office Visit: 11/24/2021 Angel  Future Office Visit: none     Requested Prescriptions   Pending Prescriptions Disp Refills     ALTAVERA 0.15-30 MG-MCG tablet [Pharmacy Med Name: [place]a 0.15-30 MG-MCG Oral Tablet] 84 tablet 0     Sig: Take 1 tablet by mouth once daily       Contraceptives Protocol Passed - 1/22/2022  9:23 AM        Passed - Patient is not a current smoker if age is 35 or older        Passed - Recent (12 mo) or future (30 days) visit within the authorizing provider's specialty     Patient has had an office visit with the authorizing provider or a provider within the authorizing providers department within the previous 12 mos or has a future within next 30 days. See \"Patient Info\" tab in inbasket, or \"Choose Columns\" in Meds & Orders section of the refill encounter.              Passed - Medication is active on med list        Passed - No active pregnancy on record        Passed - No positive pregnancy test in past 12 months             "

## 2022-07-16 ENCOUNTER — HEALTH MAINTENANCE LETTER (OUTPATIENT)
Age: 27
End: 2022-07-16

## 2022-09-17 ENCOUNTER — HEALTH MAINTENANCE LETTER (OUTPATIENT)
Age: 27
End: 2022-09-17

## 2023-07-30 ENCOUNTER — HEALTH MAINTENANCE LETTER (OUTPATIENT)
Age: 28
End: 2023-07-30

## 2024-09-21 ENCOUNTER — HEALTH MAINTENANCE LETTER (OUTPATIENT)
Age: 29
End: 2024-09-21

## (undated) RX ORDER — DEXAMETHASONE SODIUM PHOSPHATE 4 MG/ML
INJECTION, SOLUTION INTRA-ARTICULAR; INTRALESIONAL; INTRAMUSCULAR; INTRAVENOUS; SOFT TISSUE
Status: DISPENSED
Start: 2021-11-16

## (undated) RX ORDER — ACETAMINOPHEN 500 MG
TABLET ORAL
Status: DISPENSED
Start: 2021-11-16

## (undated) RX ORDER — AZITHROMYCIN 250 MG/1
TABLET, FILM COATED ORAL
Status: DISPENSED
Start: 2021-11-16

## (undated) RX ORDER — METOCLOPRAMIDE HYDROCHLORIDE 5 MG/ML
INJECTION INTRAMUSCULAR; INTRAVENOUS
Status: DISPENSED
Start: 2021-11-15

## (undated) RX ORDER — ONDANSETRON 2 MG/ML
INJECTION INTRAMUSCULAR; INTRAVENOUS
Status: DISPENSED
Start: 2021-11-14

## (undated) RX ORDER — SODIUM CHLORIDE 9 MG/ML
INJECTION, SOLUTION INTRAVENOUS
Status: DISPENSED
Start: 2021-11-15

## (undated) RX ORDER — ACETAMINOPHEN 500 MG
TABLET ORAL
Status: DISPENSED
Start: 2021-11-14

## (undated) RX ORDER — KETOROLAC TROMETHAMINE 15 MG/ML
INJECTION, SOLUTION INTRAMUSCULAR; INTRAVENOUS
Status: DISPENSED
Start: 2021-11-15

## (undated) RX ORDER — SODIUM CHLORIDE 9 MG/ML
INJECTION, SOLUTION INTRAVENOUS
Status: DISPENSED
Start: 2021-11-14

## (undated) RX ORDER — DIPHENHYDRAMINE HYDROCHLORIDE 50 MG/ML
INJECTION INTRAMUSCULAR; INTRAVENOUS
Status: DISPENSED
Start: 2021-11-15

## (undated) RX ORDER — ACETAMINOPHEN 500 MG
TABLET ORAL
Status: DISPENSED
Start: 2021-11-15

## (undated) RX ORDER — LORAZEPAM 0.5 MG/1
TABLET ORAL
Status: DISPENSED
Start: 2021-11-16